# Patient Record
Sex: MALE | Race: WHITE | NOT HISPANIC OR LATINO | Employment: FULL TIME | URBAN - METROPOLITAN AREA
[De-identification: names, ages, dates, MRNs, and addresses within clinical notes are randomized per-mention and may not be internally consistent; named-entity substitution may affect disease eponyms.]

---

## 2017-02-03 ENCOUNTER — OFFICE VISIT (OUTPATIENT)
Dept: FAMILY MEDICINE | Facility: CLINIC | Age: 37
End: 2017-02-03
Payer: MEDICARE

## 2017-02-03 VITALS
BODY MASS INDEX: 22.9 KG/M2 | HEIGHT: 70 IN | RESPIRATION RATE: 18 BRPM | OXYGEN SATURATION: 98 % | TEMPERATURE: 98.4 F | DIASTOLIC BLOOD PRESSURE: 75 MMHG | HEART RATE: 77 BPM | SYSTOLIC BLOOD PRESSURE: 126 MMHG | WEIGHT: 160 LBS

## 2017-02-03 DIAGNOSIS — Z01.818 PREOP GENERAL PHYSICAL EXAM: Primary | ICD-10-CM

## 2017-02-03 DIAGNOSIS — F31.70 BIPOLAR DISORDER IN FULL REMISSION, MOST RECENT EPISODE UNSPECIFIED TYPE (H): ICD-10-CM

## 2017-02-03 DIAGNOSIS — M25.531 RIGHT WRIST PAIN: ICD-10-CM

## 2017-02-03 DIAGNOSIS — Z87.2 HISTORY OF KELOID OF SKIN: ICD-10-CM

## 2017-02-03 PROCEDURE — 99214 OFFICE O/P EST MOD 30 MIN: CPT | Performed by: FAMILY MEDICINE

## 2017-02-03 NOTE — NURSING NOTE
"Chief Complaint   Patient presents with     Pre-Op Exam       Initial /75 mmHg  Pulse 77  Temp(Src) 98.4  F (36.9  C) (Oral)  Resp 18  Ht 5' 10.25\" (1.784 m)  Wt 160 lb (72.576 kg)  BMI 22.80 kg/m2  SpO2 98% Estimated body mass index is 22.8 kg/(m^2) as calculated from the following:    Height as of this encounter: 5' 10.25\" (1.784 m).    Weight as of this encounter: 160 lb (72.576 kg).  Medication Reconciliation: complete     Danielle Jin CMA        "

## 2017-02-03 NOTE — MR AVS SNAPSHOT
After Visit Summary   2/3/2017    Bereket Perry    MRN: [id number]           Patient Information     Date Of Birth          1980        Visit Information        Provider Department      2/3/2017 4:00 PM Clyde Farr MD [facility]        Today's Diagnoses     Preop general physical exam    -  1     Right wrist pain         Bipolar disorder in full remission, most recent episode unspecified type (H)         History of keloid of skin           Care Instructions      Before Your Surgery      Call your surgeon if there is any change in your health. This includes signs of a cold or flu (such as a sore throat, runny nose, cough, rash or fever).    Do not smoke, drink alcohol or take over the counter medicine (unless your surgeon or primary care doctor tells you to) for the 24 hours before and after surgery.    If you take prescribed drugs: Follow your doctor s orders about which medicines to take and which to stop until after surgery.    Eating and drinking prior to surgery: follow the instructions from your surgeon    Take a shower or bath the night before surgery. Use the soap your surgeon gave you to gently clean your skin. If you do not have soap from your surgeon, use your regular soap. Do not shave or scrub the surgery site.  Wear clean pajamas and have clean sheets on your bed.         Follow-ups after your visit        Who to contact     If you have questions or need follow up information about today's clinic visit or your schedule please contact [facility] directly at [phone].  Normal or non-critical lab and imaging results will be communicated to you by MyChart, letter or phone within 4 business days after the clinic has received the results. If you do not hear from us within 7 days, please contact the clinic through MyChart or phone. If you have a critical or abnormal lab result, we will notify you by phone as soon as possible.  Submit  "refill requests through [employer] or call your pharmacy and they will forward the refill request to us. Please allow 3 business days for your refill to be completed.          Additional Information About Your Visit        [email or web address] Information     [employer] gives you secure access to your electronic health record. If you see a primary care provider, you can also send messages to your care team and make appointments. If you have questions, please call your primary care clinic.  If you do not have a primary care provider, please call [phone] and they will assist you.        Care EveryWhere ID     This is your Care EveryWhere ID. This could be used by other organizations to access your [facility] medical records  [id number]        Your Vitals Were     Pulse Temperature Respirations Height BMI (Body Mass Index) Pulse Oximetry    77 98.4  F (36.9  C) (Oral) 18 5' 10.25\" (1.784 m) 22.80 kg/m2 98%       Blood Pressure from Last 3 Encounters:   02/03/17 126/75   12/05/16 122/80   06/22/16 115/67    Weight from Last 3 Encounters:   02/03/17 160 lb (72.576 kg)   12/05/16 158 lb (71.668 kg)   06/22/16 159 lb (72.122 kg)              Today, you had the following     No orders found for display       Primary Care Provider Office Phone # Fax #    Clyde Farr [phone]       [facility] [address]  [facility] [address]        Thank you!     Thank you for choosing [facility]  for your care. Our goal is always to provide you with excellent care. Hearing back from our patients is one way we can continue to improve our services. Please take a few minutes to complete the written survey that you may receive in the mail after your visit with us. Thank you!             Your Updated Medication List - Protect others around you: Learn how to safely use, store and throw away your medicines at www.disposemymeds.org.      Notice  As of 2/3/2017  4:40 PM    You have not been prescribed any " medications.

## 2017-02-03 NOTE — PROGRESS NOTES
[address] [address]  [phone]  Dept: [phone]    PRE-OP EVALUATION:  Today's date: 2017    Bereket Perry (: 1980) presents for pre-operative evaluation assessment as requested by Dr. Jimmy Kaufman.  He requires evaluation and anesthesia risk assessment prior to undergoing surgery/procedure for treatment of right wrist .  Proposed procedure: right wrist    Date of Surgery/ Procedure: 2017  Time of Surgery/ Procedure: 1:00  Hospital/Surgical Facility: [facility]   Fax number for surgical facility: [phone]  Primary Physician: Clyde Farr  Type of Anesthesia Anticipated: to be determined    Patient has a Health Care Directive or Living Will:  NO    1. NO - Do you have a history of heart attack, stroke, stent, bypass or surgery on an artery in the head, neck, heart or legs?  2. NO - Do you ever have any pain or discomfort in your chest?  3. NO - Do you have a history of  Heart Failure?  4. NO - Are you troubled by shortness of breath when: walking on the level, up a slight hill or at night?  5. NO - Do you currently have a cold, bronchitis or other respiratory infection?  6. NO - Do you have a cough, shortness of breath or wheezing?  7. NO - Do you sometimes get pains in the calves of your legs when you walk?  8. NO - Do you or anyone in your family have previous history of blood clots?  9. NO - Do you or does anyone in your family have a serious bleeding problem such as prolonged bleeding following surgeries or cuts?  10. NO - Have you ever had problems with anemia or been told to take iron pills?  11. NO - Have you had any abnormal blood loss such as black, tarry or bloody stools, or abnormal vaginal bleeding?  12. NO - Have you ever had a blood transfusion?  13. NO - Have you or any of your relatives ever had problems with anesthesia?  14. NO - Do you have sleep apnea, excessive snoring or daytime  drowsiness?  15. NO - Do you have any prosthetic heart valves?  16. NO - Do you have prosthetic joints?  17. NO - Is there any chance that you may be pregnant?            HPI:                                                      Brief HPI related to upcoming procedure: wrist fracture years ago with current deformity and pain. To have surgery to correct this.       Bipolar-controlled currently.     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 Provider to review       Bipolar disorder (H) 03/01/2005     Manic-depressive  Problem list name updated by automated process. Provider to review       Tourette's disorder 03/01/2005      Past Medical History   Diagnosis Date     Allergic rhinitis, cause unspecified      Allergic rhinitis     Bipolar disorder, unspecified (H)      Manic-depressive     Tourette's disorder      Esophageal reflux      Gastroesophageal reflux     Hiatal hernia      s/p douglas     Past Surgical History   Procedure Laterality Date     Surgical history of -   1995     LT wrist surgery     Surgical history of -   1999     RT wrist surgery     Esophagoscopy, gastroscopy, duodenoscopy (egd), combined  11/4/2013     Procedure: COMBINED ESOPHAGOSCOPY, GASTROSCOPY, DUODENOSCOPY (EGD), BIOPSY SINGLE OR MULTIPLE;;  Surgeon: Alex Erwin MD;  Location:  GI     Clavicle surgery       Esophageal motility study  12/17/13     Hc esoph/gas reflux test w nasal imped electrode  12/17/2013     Procedure: ESOPHAGEAL IMPEDENCE FUNCTION TEST 1 HOUR OR LESS;  Surgeon: Alex Erwin MD;  Location:  GI     Laparoscopic nissen fundoplication  1/10/2014     Procedure:  "LAPAROSCOPIC NISSEN FUNDOPLICATION;  Laparoscopic Nissen Fundoplication,hiatal hernia repair,and Upper Endoscopy *Latex Allergy* ;  Surgeon: Daniel Gutiérrez MD;  Location: [facility] OR     Esophagoscopy, gastroscopy, duodenoscopy (egd), combined  1/10/2014     Procedure: COMBINED ESOPHAGOSCOPY, GASTROSCOPY, DUODENOSCOPY (EGD);;  Surgeon: Daniel Gutiérrez MD;  Location: [facility] OR     Other surgical history  5/2015     wrist surgery     No current outpatient prescriptions on file.     OTC products: None, except as noted above    Allergies   Allergen Reactions     Aloe      Bee Venom      Latex Itching     Morphine      Synthetic also     Other [Seasonal Allergies]      Fleece       Percocet [Oxycodone-Acetaminophen]      percocet      Risperidone      Trouble swallowing     Sodium Lauryl Sulfate      Wool Fiber      No Clinical Screening - See Comments Rash     Contact dermatitis       Latex Allergy: yes-contact dermatitis. Not severe    Social History   Substance Use Topics     Smoking status: Former Smoker -- 1.00 packs/day for 8 years     Types: Cigarettes     Start date: 07/01/1995     Quit date: 06/25/2003     Smokeless tobacco: Never Used     Alcohol Use: No     History   Drug Use No       REVIEW OF SYSTEMS:                                                    C: NEGATIVE for fever, chills, change in weight  E/M: NEGATIVE for ear, mouth and throat problems  R: NEGATIVE for significant cough or SOB  CV: NEGATIVE for chest pain, palpitations or peripheral edema    EXAM:                                                    /75 mmHg  Pulse 77  Temp(Src) 98.4  F (36.9  C) (Oral)  Resp 18  Ht 5' 10.25\" (1.784 m)  Wt 160 lb (72.576 kg)  BMI 22.80 kg/m2  SpO2 98%  GENERAL APPEARANCE: healthy, alert and no distress  HENT: ear canals and TM's normal and nose and mouth without ulcers or lesions  RESP: lungs clear to auscultation - no rales, rhonchi or wheezes  CV: regular rate and rhythm, normal S1 S2, " no S3 or S4 and no murmur, click or rub   ABDOMEN: soft, nontender, no HSM or masses and bowel sounds normal  NEURO: Normal strength and tone, sensory exam grossly normal, mentation intact and speech normal    DIAGNOSTICS:                                                    No labs or EKG required for low risk surgery (cataract, skin procedure, breast biopsy, etc)    Recent Labs   Lab Test  12/05/16   1411 08/11/15  05/22/14   1208  02/09/14   1454   HGB  14.6   --   13.8  14.7   PLT  209   --   204  219   INR   --    --    --   1.02   NA  142  140  141  142   POTASSIUM  4.2  3.8  3.9  4.0   CR  1.16  0.94  1.04  0.98        IMPRESSION:                                                        ICD-10-CM    1. Preop general physical exam Z01.818    2. Right wrist pain M25.531    3. Bipolar disorder in full remission, most recent episode unspecified type (H) F31.70    4. History of keloid of skin Z87.2         The proposed surgical procedure is considered INTERMEDIATE risk.    REVISED CARDIAC RISK INDEX  The patient has the following serious cardiovascular risks for perioperative complications such as (MI, PE, VFib and 3  AV Block):  No serious cardiac risks  INTERPRETATION: 0 risks: Class I (very low risk - 0.4% complication rate)    The patient has the following additional risks for perioperative complications:  No identified additional risks      RECOMMENDATIONS:                                                      --Patient is to take all scheduled medications on the day of surgery EXCEPT for modifications listed below.    APPROVAL GIVEN to proceed with proposed procedure, without further diagnostic evaluation       Signed Electronically by: Clyde Farr MD    Copy of this evaluation report is provided to requesting physician.    [facility] Preop Guidelines

## 2017-02-06 ENCOUNTER — TRANSFERRED RECORDS (OUTPATIENT)
Dept: HEALTH INFORMATION MANAGEMENT | Facility: CLINIC | Age: 37
End: 2017-02-06

## 2017-02-15 ENCOUNTER — TRANSFERRED RECORDS (OUTPATIENT)
Dept: HEALTH INFORMATION MANAGEMENT | Facility: CLINIC | Age: 37
End: 2017-02-15

## 2017-03-01 ENCOUNTER — TRANSFERRED RECORDS (OUTPATIENT)
Dept: HEALTH INFORMATION MANAGEMENT | Facility: CLINIC | Age: 37
End: 2017-03-01

## 2017-03-22 ENCOUNTER — TRANSFERRED RECORDS (OUTPATIENT)
Dept: HEALTH INFORMATION MANAGEMENT | Facility: CLINIC | Age: 37
End: 2017-03-22

## 2017-04-12 ENCOUNTER — TRANSFERRED RECORDS (OUTPATIENT)
Dept: HEALTH INFORMATION MANAGEMENT | Facility: CLINIC | Age: 37
End: 2017-04-12

## 2017-05-08 ENCOUNTER — DOCUMENTATION ONLY (OUTPATIENT)
Dept: FAMILY MEDICINE | Facility: CLINIC | Age: 37
End: 2017-05-08

## 2017-05-08 NOTE — PROGRESS NOTES
[facility] needs you to review and check off the ability to perform. In form folder. No medication list to print. Katrina Salvador CMA

## 2017-05-08 NOTE — PROGRESS NOTES
Reason for call: Form   Our goal is to have forms completed within 72 hours, however some forms may require a visit or additional information.     Who is the form from? Patient  Where did the form come from? Patient or family brought in     What clinic location was the form placed at?   Where was the form placed? 's Box  What number is listed as a contact on the form? [phone]    Phone call message - patient request for a letter, form or note:     Date needed: as soon as possible  Please fax to [phone]  Has the patient signed a consent form for release of information? NO    Additional comments: Patient would like to start program as soon as possible and needs this form to be filled out to begin.    Type of letter, form or note: medical    Phone Number Pt can be reached at: Home number on file [phone] (home)  Best Time: Any  Can we leave a detailed message on this number? YES

## 2017-05-10 ENCOUNTER — TRANSFERRED RECORDS (OUTPATIENT)
Dept: HEALTH INFORMATION MANAGEMENT | Facility: CLINIC | Age: 37
End: 2017-05-10

## 2017-06-07 ENCOUNTER — TRANSFERRED RECORDS (OUTPATIENT)
Dept: HEALTH INFORMATION MANAGEMENT | Facility: CLINIC | Age: 37
End: 2017-06-07

## 2017-07-15 ENCOUNTER — NURSE TRIAGE (OUTPATIENT)
Dept: NURSING | Facility: CLINIC | Age: 37
End: 2017-07-15

## 2017-07-15 NOTE — TELEPHONE ENCOUNTER
Patient calling requesting his bllod type.  Lab performed on  show patient's blood type is O Positive.  Patient verbalized understanding and is appreciative of information.

## 2017-07-20 ENCOUNTER — OFFICE VISIT (OUTPATIENT)
Dept: FAMILY MEDICINE | Facility: CLINIC | Age: 37
End: 2017-07-20
Payer: MEDICARE

## 2017-07-20 VITALS
BODY MASS INDEX: 21.81 KG/M2 | HEART RATE: 75 BPM | DIASTOLIC BLOOD PRESSURE: 66 MMHG | TEMPERATURE: 98 F | SYSTOLIC BLOOD PRESSURE: 111 MMHG | OXYGEN SATURATION: 96 % | WEIGHT: 155.8 LBS | HEIGHT: 71 IN

## 2017-07-20 DIAGNOSIS — R51.9 NONINTRACTABLE EPISODIC HEADACHE, UNSPECIFIED HEADACHE TYPE: Primary | ICD-10-CM

## 2017-07-20 DIAGNOSIS — E55.9 VITAMIN D DEFICIENCY: ICD-10-CM

## 2017-07-20 DIAGNOSIS — F31.70 BIPOLAR DISORDER IN PARTIAL REMISSION, MOST RECENT EPISODE UNSPECIFIED TYPE (H): ICD-10-CM

## 2017-07-20 DIAGNOSIS — G47.00 INSOMNIA, UNSPECIFIED TYPE: ICD-10-CM

## 2017-07-20 LAB
BASOPHILS # BLD AUTO: 0 10E9/L (ref 0–0.2)
BASOPHILS NFR BLD AUTO: 0.3 %
DIFFERENTIAL METHOD BLD: NORMAL
EOSINOPHIL # BLD AUTO: 0.1 10E9/L (ref 0–0.7)
EOSINOPHIL NFR BLD AUTO: 1 %
ERYTHROCYTE [DISTWIDTH] IN BLOOD BY AUTOMATED COUNT: 12.8 % (ref 10–15)
HCT VFR BLD AUTO: 44.4 % (ref 40–53)
HGB BLD-MCNC: 15 G/DL (ref 13.3–17.7)
LYMPHOCYTES # BLD AUTO: 1.7 10E9/L (ref 0.8–5.3)
LYMPHOCYTES NFR BLD AUTO: 26.9 %
MCH RBC QN AUTO: 30.8 PG (ref 26.5–33)
MCHC RBC AUTO-ENTMCNC: 33.8 G/DL (ref 31.5–36.5)
MCV RBC AUTO: 91 FL (ref 78–100)
MONOCYTES # BLD AUTO: 0.6 10E9/L (ref 0–1.3)
MONOCYTES NFR BLD AUTO: 9.4 %
NEUTROPHILS # BLD AUTO: 3.9 10E9/L (ref 1.6–8.3)
NEUTROPHILS NFR BLD AUTO: 62.4 %
PLATELET # BLD AUTO: 249 10E9/L (ref 150–450)
RBC # BLD AUTO: 4.87 10E12/L (ref 4.4–5.9)
WBC # BLD AUTO: 6.3 10E9/L (ref 4–11)

## 2017-07-20 PROCEDURE — 82306 VITAMIN D 25 HYDROXY: CPT | Performed by: NURSE PRACTITIONER

## 2017-07-20 PROCEDURE — 83735 ASSAY OF MAGNESIUM: CPT | Performed by: NURSE PRACTITIONER

## 2017-07-20 PROCEDURE — 85025 COMPLETE CBC W/AUTO DIFF WBC: CPT | Performed by: NURSE PRACTITIONER

## 2017-07-20 PROCEDURE — 80053 COMPREHEN METABOLIC PANEL: CPT | Performed by: NURSE PRACTITIONER

## 2017-07-20 PROCEDURE — 36415 COLL VENOUS BLD VENIPUNCTURE: CPT | Performed by: NURSE PRACTITIONER

## 2017-07-20 PROCEDURE — 99214 OFFICE O/P EST MOD 30 MIN: CPT | Performed by: NURSE PRACTITIONER

## 2017-07-20 NOTE — MR AVS SNAPSHOT
After Visit Summary   7/20/2017    Bereket Perry    MRN: [id number]           Patient Information     Date Of Birth          1980        Visit Information        Provider Department      7/20/2017 1:00 PM Alexandra Ryan APRN CNP [facility]        Today's Diagnoses     Nonintractable episodic headache, unspecified headache type    -  1    Bipolar disorder in partial remission, most recent episode unspecified type (H)        Insomnia, unspecified type        Vitamin D deficiency           Follow-ups after your visit        Who to contact     If you have questions or need follow up information about today's clinic visit or your schedule please contact [facility] directly at [phone].  Normal or non-critical lab and imaging results will be communicated to you by MyChart, letter or phone within 4 business days after the clinic has received the results. If you do not hear from us within 7 days, please contact the clinic through [employer] or phone. If you have a critical or abnormal lab result, we will notify you by phone as soon as possible.  Submit refill requests through [employer] or call your pharmacy and they will forward the refill request to us. Please allow 3 business days for your refill to be completed.          Additional Information About Your Visit        MyChart Information     [employer] gives you secure access to your electronic health record. If you see a primary care provider, you can also send messages to your care team and make appointments. If you have questions, please call your primary care clinic.  If you do not have a primary care provider, please call [phone] and they will assist you.        Care EveryWhere ID     This is your Care EveryWhere ID. This could be used by other organizations to access your [facility] medical records  [id number]        Your Vitals Were     Pulse Temperature Height Pulse Oximetry BMI (Body Mass  "Index)       75 98  F (36.7  C) (Oral) 5' 11.4\" (1.814 m) 96% 21.49 kg/m2        Blood Pressure from Last 3 Encounters:   07/20/17 111/66   02/03/17 126/75   12/05/16 122/80    Weight from Last 3 Encounters:   07/20/17 155 lb 12.8 oz (70.7 kg)   02/03/17 160 lb (72.6 kg)   12/05/16 158 lb (71.7 kg)              We Performed the Following     CBC with platelets differential     Comprehensive metabolic panel     Magnesium     Vitamin D Deficiency          Today's Medication Changes          These changes are accurate as of: 7/20/17 11:59 PM.  If you have any questions, ask your nurse or doctor.               Start taking these medicines.        Dose/Directions    melatonin 3 MG tablet   Used for:  Insomnia, unspecified type   Started by:  Alexandra Ryan APRN CNP        Dose:  3 mg   Take 1 tablet (3 mg) by mouth nightly as needed for sleep   Quantity:  60 tablet   Refills:  0            Where to get your medicines      Some of these will need a paper prescription and others can be bought over the counter.  Ask your nurse if you have questions.     You don't need a prescription for these medications     melatonin 3 MG tablet                Primary Care Provider Office Phone # Fax #    Clyde Farr [phone]       [place] [address]        Equal Access to Services     GÉNESIS ESCALONA AH: Hadii yolande diaz hadasho Soomaali, waaxda luqadaha, qaybta kaalmada justyna, skyler vilchis. So [facility] [phone].    ATENCIÓN: Si habla español, tiene a douglass disposición servicios gratuitos de asistencia lingüística. Sabrina al [phone].    We comply with applicable federal civil rights laws and [place] laws. We do not discriminate on the basis of race, color, national origin, age, disability sex, sexual orientation or gender identity.            Thank you!     Thank you for choosing [facility]  for your care. Our goal is always " to provide you with excellent care. Hearing back from our patients is one way we can continue to improve our services. Please take a few minutes to complete the written survey that you may receive in the mail after your visit with us. Thank you!             Your Updated Medication List - Protect others around you: Learn how to safely use, store and throw away your medicines at www.disposemymeds.org.          This list is accurate as of: 7/20/17 11:59 PM.  Always use your most recent med list.                   Brand Name Dispense Instructions for use Diagnosis    melatonin 3 MG tablet     60 tablet    Take 1 tablet (3 mg) by mouth nightly as needed for sleep    Insomnia, unspecified type

## 2017-07-20 NOTE — PROGRESS NOTES
"  SUBJECTIVE:                                                    Bereket Perry is a 37 year old male who presents to clinic today for the following health issues:    Headaches      Duration: on going    Description  Location: bilateral in the frontal area   Character: squeezing pain  Frequency:  Comes and goes  Duration:  On going    Intensity:  severe    Accompanying signs and symptoms:    History  Head trauma: YES  Previous tests for headaches:   Able to do daily activities when headache present: YES  Wake with headaches: YES  Daily pain medication use: no   Any changes in: STRESs yes    Tylenol or advil helping.  Just wants to make sure not being caused by vitamin imbalance    Insomnia      Duration: on going    Description  Frequency of insomnia:  several times a week  Time to fall asleep: 2 hours  Middle of night awakenin-2 times a week  Early morning awakening:  occasionally    Accompanying signs and symptoms:  none    History    Problem list and histories reviewed & adjusted, as indicated.  Additional history: as documented      Reviewed and updated as needed this visit by clinical staff  Tobacco  Allergies  Meds  Problems  Med Hx  Surg Hx  Fam Hx  Soc Hx        Reviewed and updated as needed this visit by Provider  Tobacco  Allergies  Meds  Problems  Med Hx  Surg Hx  Fam Hx  Soc Hx          ROS:  Constitutional, HEENT, cardiovascular, pulmonary, gi and gu systems are negative, except as otherwise noted.      OBJECTIVE:   /66  Pulse 75  Temp 98  F (36.7  C) (Oral)  Ht 5' 11.4\" (1.814 m)  Wt 155 lb 12.8 oz (70.7 kg)  SpO2 96%  BMI 21.49 kg/m2  Body mass index is 21.49 kg/(m^2).  GENERAL: healthy, alert and no distress  NECK: no adenopathy, no asymmetry, masses, or scars and thyroid normal to palpation  RESP: lungs clear to auscultation - no rales, rhonchi or wheezes  CV: regular rate and rhythm, normal S1 S2, no S3 or S4, no murmur, click or rub, no peripheral edema and " peripheral pulses strong  ABDOMEN: soft, nontender, no hepatosplenomegaly, no masses and bowel sounds normal  MS: no gross musculoskeletal defects noted, no edema  SKIN: no suspicious lesions or rashes    Diagnostic Test Results:  Results for orders placed or performed in visit on 07/20/17   Magnesium   Result Value Ref Range    Magnesium 2.4 (H) 1.6 - 2.3 mg/dL   Vitamin D Deficiency   Result Value Ref Range    Vitamin D Deficiency screening 34 20 - 75 ug/L   Comprehensive metabolic panel   Result Value Ref Range    Sodium 140 133 - 144 mmol/L    Potassium 3.9 3.4 - 5.3 mmol/L    Chloride 106 94 - 109 mmol/L    Carbon Dioxide 28 20 - 32 mmol/L    Anion Gap 6 3 - 14 mmol/L    Glucose 103 (H) 70 - 99 mg/dL    Urea Nitrogen 20 7 - 30 mg/dL    Creatinine 0.96 0.66 - 1.25 mg/dL    GFR Estimate 88 >60 mL/min/1.7m2    GFR Estimate If Black >90   GFR Calc   >60 mL/min/1.7m2    Calcium 8.6 8.5 - 10.1 mg/dL    Bilirubin Total 1.1 0.2 - 1.3 mg/dL    Albumin 4.1 3.4 - 5.0 g/dL    Protein Total 7.4 6.8 - 8.8 g/dL    Alkaline Phosphatase 95 40 - 150 U/L    ALT 34 0 - 70 U/L    AST 23 0 - 45 U/L   CBC with platelets differential   Result Value Ref Range    WBC 6.3 4.0 - 11.0 10e9/L    RBC Count 4.87 4.4 - 5.9 10e12/L    Hemoglobin 15.0 13.3 - 17.7 g/dL    Hematocrit 44.4 40.0 - 53.0 %    MCV 91 78 - 100 fl    MCH 30.8 26.5 - 33.0 pg    MCHC 33.8 31.5 - 36.5 g/dL    RDW 12.8 10.0 - 15.0 %    Platelet Count 249 150 - 450 10e9/L    Diff Method Automated Method     % Neutrophils 62.4 %    % Lymphocytes 26.9 %    % Monocytes 9.4 %    % Eosinophils 1.0 %    % Basophils 0.3 %    Absolute Neutrophil 3.9 1.6 - 8.3 10e9/L    Absolute Lymphocytes 1.7 0.8 - 5.3 10e9/L    Absolute Monocytes 0.6 0.0 - 1.3 10e9/L    Absolute Eosinophils 0.1 0.0 - 0.7 10e9/L    Absolute Basophils 0.0 0.0 - 0.2 10e9/L       ASSESSMENT/PLAN:       ICD-10-CM    1. Nonintractable episodic headache, unspecified headache type R51 Magnesium      Comprehensive metabolic panel     CBC with platelets differential     CANCELED: Calcium   2. Bipolar disorder in partial remission, most recent episode unspecified type (H) F31.70    3. Insomnia, unspecified type G47.00 melatonin 3 MG tablet   4. Vitamin D deficiency E55.9 Vitamin D Deficiency     Screen labs for any lab imbalance that may contribute to headaches or insomnia.    Bipolar disorder stable.    Start melatonin 3 mg PO QHS.    Vitamin D therapeutic.      NETTE Welch CNP  [facility]

## 2017-07-21 LAB
ALBUMIN SERPL-MCNC: 4.1 G/DL (ref 3.4–5)
ALP SERPL-CCNC: 95 U/L (ref 40–150)
ALT SERPL W P-5'-P-CCNC: 34 U/L (ref 0–70)
ANION GAP SERPL CALCULATED.3IONS-SCNC: 6 MMOL/L (ref 3–14)
AST SERPL W P-5'-P-CCNC: 23 U/L (ref 0–45)
BILIRUB SERPL-MCNC: 1.1 MG/DL (ref 0.2–1.3)
BUN SERPL-MCNC: 20 MG/DL (ref 7–30)
CALCIUM SERPL-MCNC: 8.6 MG/DL (ref 8.5–10.1)
CHLORIDE SERPL-SCNC: 106 MMOL/L (ref 94–109)
CO2 SERPL-SCNC: 28 MMOL/L (ref 20–32)
CREAT SERPL-MCNC: 0.96 MG/DL (ref 0.66–1.25)
DEPRECATED CALCIDIOL+CALCIFEROL SERPL-MC: 34 UG/L (ref 20–75)
GFR SERPL CREATININE-BSD FRML MDRD: 88 ML/MIN/1.7M2
GLUCOSE SERPL-MCNC: 103 MG/DL (ref 70–99)
MAGNESIUM SERPL-MCNC: 2.4 MG/DL (ref 1.6–2.3)
POTASSIUM SERPL-SCNC: 3.9 MMOL/L (ref 3.4–5.3)
PROT SERPL-MCNC: 7.4 G/DL (ref 6.8–8.8)
SODIUM SERPL-SCNC: 140 MMOL/L (ref 133–144)

## 2017-07-25 PROBLEM — E55.9 VITAMIN D DEFICIENCY: Status: ACTIVE | Noted: 2017-07-25

## 2017-07-25 RX ORDER — LANOLIN ALCOHOL/MO/W.PET/CERES
3 CREAM (GRAM) TOPICAL
Qty: 60 TABLET | Refills: 0 | COMMUNITY
Start: 2017-07-25 | End: 2018-07-31

## 2017-08-02 ENCOUNTER — TRANSFERRED RECORDS (OUTPATIENT)
Dept: HEALTH INFORMATION MANAGEMENT | Facility: CLINIC | Age: 37
End: 2017-08-02

## 2017-10-09 ENCOUNTER — TRANSFERRED RECORDS (OUTPATIENT)
Dept: HEALTH INFORMATION MANAGEMENT | Facility: CLINIC | Age: 37
End: 2017-10-09

## 2018-04-25 ENCOUNTER — TRANSFERRED RECORDS (OUTPATIENT)
Dept: HEALTH INFORMATION MANAGEMENT | Facility: CLINIC | Age: 38
End: 2018-04-25

## 2018-05-03 ENCOUNTER — TRANSFERRED RECORDS (OUTPATIENT)
Dept: HEALTH INFORMATION MANAGEMENT | Facility: CLINIC | Age: 38
End: 2018-05-03

## 2018-05-31 ENCOUNTER — TRANSFERRED RECORDS (OUTPATIENT)
Dept: HEALTH INFORMATION MANAGEMENT | Facility: CLINIC | Age: 38
End: 2018-05-31

## 2018-06-22 ENCOUNTER — TELEPHONE (OUTPATIENT)
Dept: FAMILY MEDICINE | Facility: CLINIC | Age: 38
End: 2018-06-22

## 2018-06-22 DIAGNOSIS — R68.89 LIGHT SENSITIVITY: Primary | ICD-10-CM

## 2018-06-22 DIAGNOSIS — R41.840 DIFFICULTY CONCENTRATING: ICD-10-CM

## 2018-06-22 NOTE — TELEPHONE ENCOUNTER
Reason for Call:  Other referral    Detailed comments: Patient states about 5/6 years ago he suffered a concussion and brain injury and now has long-term symptoms. States it has gotten better slowly but he will soon be going back to school and is working full time. States he suffers when there is too much stimulation and noise. He has trouble concentrating / thinking, has light sensitivity, and feel vibrations. He is hoping to be referred directly to a specialist who can help with this. Please assist. Thanks!    Phone Number Patient can be reached at: Home number on file [phone] (home)    Best Time: Any    Can we leave a detailed message on this number? YES    Call taken on 6/22/2018 at 4:42 PM by Cherise Spears

## 2018-06-25 NOTE — TELEPHONE ENCOUNTER
LOV: 7/20/2017    Referral for Neurology placed. LV for patient. Patient to be given phone numbers below.     Your provider has referred you for the following:   Consult at [facility]: [facility] [phone]   [email or web address]  [facility]: [facility] [phone]   [email or web address]  [place]: [facility] [phone]   [email or web address]  Light sensitivity and trouble concentrating     Please be aware that coverage of these services is subject to the terms and limitations of your health insurance plan.  Call member services at your health plan with any benefit or coverage questions.       Please bring the following with you to your appointment:     (1) Any X-Rays, CTs or MRIs which have been performed.  Contact the facility where they were done to arrange for  prior to your scheduled appointment.    (2) List of current medications  (3) This referral request   (4) Any documents/labs given to you for this referral      Thanks! Tasneem Rincon RN

## 2018-07-13 ENCOUNTER — OFFICE VISIT (OUTPATIENT)
Dept: FAMILY MEDICINE | Facility: CLINIC | Age: 38
End: 2018-07-13
Payer: MEDICARE

## 2018-07-13 VITALS
SYSTOLIC BLOOD PRESSURE: 96 MMHG | WEIGHT: 153.4 LBS | HEIGHT: 71 IN | TEMPERATURE: 97.3 F | DIASTOLIC BLOOD PRESSURE: 64 MMHG | OXYGEN SATURATION: 100 % | RESPIRATION RATE: 16 BRPM | HEART RATE: 55 BPM | BODY MASS INDEX: 21.48 KG/M2

## 2018-07-13 DIAGNOSIS — Z13.1 SCREENING FOR DIABETES MELLITUS: Primary | ICD-10-CM

## 2018-07-13 LAB — GLUCOSE SERPL-MCNC: 94 MG/DL (ref 70–99)

## 2018-07-13 PROCEDURE — 36415 COLL VENOUS BLD VENIPUNCTURE: CPT | Performed by: NURSE PRACTITIONER

## 2018-07-13 PROCEDURE — 99213 OFFICE O/P EST LOW 20 MIN: CPT | Performed by: NURSE PRACTITIONER

## 2018-07-13 PROCEDURE — 82947 ASSAY GLUCOSE BLOOD QUANT: CPT | Performed by: NURSE PRACTITIONER

## 2018-07-13 ASSESSMENT — PATIENT HEALTH QUESTIONNAIRE - PHQ9: 5. POOR APPETITE OR OVEREATING: SEVERAL DAYS

## 2018-07-13 ASSESSMENT — ANXIETY QUESTIONNAIRES
GAD7 TOTAL SCORE: 2
7. FEELING AFRAID AS IF SOMETHING AWFUL MIGHT HAPPEN: NOT AT ALL
6. BECOMING EASILY ANNOYED OR IRRITABLE: NOT AT ALL
1. FEELING NERVOUS, ANXIOUS, OR ON EDGE: SEVERAL DAYS
3. WORRYING TOO MUCH ABOUT DIFFERENT THINGS: NOT AT ALL
2. NOT BEING ABLE TO STOP OR CONTROL WORRYING: NOT AT ALL
5. BEING SO RESTLESS THAT IT IS HARD TO SIT STILL: NOT AT ALL

## 2018-07-13 NOTE — MR AVS SNAPSHOT
After Visit Summary   7/13/2018    Bereket Perry    MRN: [id number]           Patient Information     Date Of Birth          1980        Visit Information        Provider Department      7/13/2018 11:20 AM Alexandra Ryan APRN CNP [facility]        Today's Diagnoses     Screening for diabetes mellitus    -  1       Follow-ups after your visit        Your next 10 appointments already scheduled     Jul 31, 2018  2:00 PM CDT   New Visit with Santosh Issa MD   [facility]    [address] [place] [id number] [phone]              Who to contact     If you have questions or need follow up information about today's clinic visit or your schedule please contact [facility] directly at [phone].  Normal or non-critical lab and imaging results will be communicated to you by MyChart, letter or phone within 4 business days after the clinic has received the results. If you do not hear from us within 7 days, please contact the clinic through MyChart or phone. If you have a critical or abnormal lab result, we will notify you by phone as soon as possible.  Submit refill requests through wat[employer]ame or call your pharmacy and they will forward the refill request to us. Please allow 3 business days for your refill to be completed.          Additional Information About Your Visit        MyChart Information     [employer]ame gives you secure access to your electronic health record. If you see a primary care provider, you can also send messages to your care team and make appointments. If you have questions, please call your primary care clinic.  If you do not have a primary care provider, please call [phone] and they will assist you.        Care EveryWhere ID     This is your Care EveryWhere ID. This could be used by other organizations to access your [facility]  "records  [id number]        Your Vitals Were     Pulse Temperature Respirations Height Pulse Oximetry BMI (Body Mass Index)    55 97.3  F (36.3  C) (Oral) 16 5' 11\" (1.803 m) 100% 21.39 kg/m2       Blood Pressure from Last 3 Encounters:   07/13/18 96/64   07/20/17 111/66   02/03/17 126/75    Weight from Last 3 Encounters:   07/13/18 153 lb 6.4 oz (69.6 kg)   07/20/17 155 lb 12.8 oz (70.7 kg)   02/03/17 160 lb (72.6 kg)              We Performed the Following     Glucose        Primary Care Provider Office Phone # Fax #    Clyde Farr [phone] [address]  [facility] [address]        Equal Access to Services     MAYRA ESCALONA : Hadii aad emily hadasho Soomaali, waaxda luqadaha, qaybta kaalmada adeegyada, skyler elias haykael enriquez . So [facility] [phone].    ATENCIÓN: Si habla español, tiene a douglass disposición servicios gratuitos de asistencia lingüística. Sabrina al [phone].    We comply with applicable federal civil rights laws and [place] laws. We do not discriminate on the basis of race, color, national origin, age, disability, sex, sexual orientation, or gender identity.            Thank you!     Thank you for choosing [facility]  for your care. Our goal is always to provide you with excellent care. Hearing back from our patients is one way we can continue to improve our services. Please take a few minutes to complete the written survey that you may receive in the mail after your visit with us. Thank you!             Your Updated Medication List - Protect others around you: Learn how to safely use, store and throw away your medicines at www.disposemymeds.org.          This list is accurate as of 7/13/18 11:59 PM.  Always use your most recent med list.                   Brand Name Dispense Instructions for use Diagnosis    melatonin 3 MG tablet     60 tablet    Take 1 tablet (3 mg) by mouth nightly as needed for sleep    Insomnia, unspecified type    "

## 2018-07-13 NOTE — PROGRESS NOTES
"  SUBJECTIVE:   Bereket Perry is a 38 year old male who presents to clinic today for the following health issues:        Musculoskeletal problem/pain      Duration: 3 years     Description  Location: foot and hands     Intensity:  severe    Accompanying signs and symptoms: numbness, tingling, swelling and redness    History  Previous similar problem: YES  Previous evaluation:  none    Precipitating or alleviating factors:  Trauma or overuse: YES- pt had hand surgery 3 months   Aggravating factors include: sitting, standing and walking    Therapies tried and outcome: nothing    Does not think he is more thirsty or voiding more than normal.    He is worried he has diabetes, would like to be screened for this.      Problem list and histories reviewed & adjusted, as indicated.  Additional history: as documented    Reviewed and updated as needed this visit by clinical staff  Tobacco  Allergies  Meds  Problems  Med Hx  Surg Hx  Fam Hx  Soc Hx        Reviewed and updated as needed this visit by Provider  Tobacco  Allergies  Meds  Problems  Med Hx  Surg Hx  Fam Hx  Soc Hx          ROS:  Constitutional, HEENT, cardiovascular, pulmonary, gi and gu systems are negative, except as otherwise noted.    OBJECTIVE:     BP 96/64  Pulse 55  Temp 97.3  F (36.3  C) (Oral)  Resp 16  Ht 5' 11\" (1.803 m)  Wt 153 lb 6.4 oz (69.6 kg)  SpO2 100%  BMI 21.39 kg/m2  Body mass index is 21.39 kg/(m^2).  GENERAL: healthy, alert and no distress  NECK: no adenopathy, no asymmetry, masses, or scars and thyroid normal to palpation  RESP: lungs clear to auscultation - no rales, rhonchi or wheezes  CV: regular rate and rhythm, normal S1 S2, no S3 or S4, no murmur, click or rub, no peripheral edema and peripheral pulses strong  ABDOMEN: soft, nontender, no hepatosplenomegaly, no masses and bowel sounds normal  MS: no gross musculoskeletal defects noted, no edema  SKIN: no suspicious lesions or rashes  NEURO: Normal strength and " tone, mentation intact and speech normal    Diagnostic Test Results:  Results for orders placed or performed in visit on 07/13/18   Glucose   Result Value Ref Range    Glucose 94 70 - 99 mg/dL       ASSESSMENT/PLAN:       ICD-10-CM    1. Screening for diabetes mellitus Z13.1 Glucose   normal fasting glucose.    Normal physical exam.  Sensation intact.    F/u if persists or worsens.  May consider ref to neurology.        NETTE Welch CNP  [facility]

## 2018-07-14 ASSESSMENT — PATIENT HEALTH QUESTIONNAIRE - PHQ9: SUM OF ALL RESPONSES TO PHQ QUESTIONS 1-9: 3

## 2018-07-14 ASSESSMENT — ANXIETY QUESTIONNAIRES: GAD7 TOTAL SCORE: 2

## 2018-07-31 ENCOUNTER — OFFICE VISIT (OUTPATIENT)
Dept: NEUROLOGY | Facility: CLINIC | Age: 38
End: 2018-07-31
Payer: MEDICARE

## 2018-07-31 VITALS
OXYGEN SATURATION: 98 % | TEMPERATURE: 98.9 F | BODY MASS INDEX: 26.36 KG/M2 | WEIGHT: 189 LBS | HEART RATE: 67 BPM | SYSTOLIC BLOOD PRESSURE: 102 MMHG | DIASTOLIC BLOOD PRESSURE: 68 MMHG | RESPIRATION RATE: 16 BRPM

## 2018-07-31 DIAGNOSIS — G44.329 CHRONIC POST-TRAUMATIC HEADACHE, NOT INTRACTABLE: Primary | ICD-10-CM

## 2018-07-31 PROCEDURE — 99205 OFFICE O/P NEW HI 60 MIN: CPT | Performed by: PSYCHIATRY & NEUROLOGY

## 2018-07-31 NOTE — MR AVS SNAPSHOT
After Visit Summary   7/31/2018    Bereket Perry    MRN: [id number]           Patient Information     Date Of Birth          1980        Visit Information        Provider Department      7/31/2018 2:00 PM Santosh Issa MD [facility]        Today's Diagnoses     Chronic post-traumatic headache, not intractable    -  1      Care Instructions    AFTER VISIT SUMMARY (AVS):    At today's visit we discussed various diagnostic possibilities for your symptoms that are likely related to posttraumatic tension type headaches, available treatment options, and the reasons for possible future work-up (brain MRI)    At the present time, you did not want to start any headache preventive medications.  Please return if you decide to try something in the future or your headache worsens.    Reviewed non-pharmacological headache prevention measures include proper sleep hygiene, regular meals, adequate hydration, regular aerobic exercise, and stress reduction techniques.    Additional recommendations after the work-up.    Next follow-up appointment is on as needed basis.    Please do not hesitate to call me with any questions or concerns.    Thanks.           Follow-ups after your visit        Follow-up notes from your care team     Return if symptoms worsen or fail to improve.      Who to contact     If you have questions or need follow up information about today's clinic visit or your schedule please contact [facility] directly at [phone].  Normal or non-critical lab and imaging results will be communicated to you by MyChart, letter or phone within 4 business days after the clinic has received the results. If you do not hear from us within 7 days, please contact the clinic through MyChart or phone. If you have a critical or abnormal lab result, we will notify you by phone as soon as possible.  Submit refill requests through [employer] or call your pharmacy and  they will forward the refill request to us. Please allow 3 business days for your refill to be completed.          Additional Information About Your Visit        Super[employer] Information     [employer] gives you secure access to your electronic health record. If you see a primary care provider, you can also send messages to your care team and make appointments. If you have questions, please call your primary care clinic.  If you do not have a primary care provider, please call [phone] and they will assist you.        Care EveryWhere ID     This is your Care EveryWhere ID. This could be used by other organizations to access your [facility] medical records  [id number]        Your Vitals Were     Pulse Temperature Respirations Pulse Oximetry BMI (Body Mass Index)       67 98.9  F (37.2  C) (Oral) 16 98% 26.36 kg/m2        Blood Pressure from Last 3 Encounters:   07/31/18 102/68   07/13/18 96/64   07/20/17 111/66    Weight from Last 3 Encounters:   07/31/18 85.7 kg (189 lb)   07/13/18 69.6 kg (153 lb 6.4 oz)   07/20/17 70.7 kg (155 lb 12.8 oz)              Today, you had the following     No orders found for display       Primary Care Provider Office Phone # Fax #    Clyde Farr [phone] [address]  [facility] [address]        Equal Access to Services     GÉNESIS ESCALONA : Hadii aad ku hadasho Soomaali, waaxda luqadaha, qaybta kaalmada adejersonyada, skyler vilchis. So [facility] [phone].    ATENCIÓN: Si habla español, tiene a douglass disposición servicios gratuitos de asistencia lingüística. Lllilibeth al [phone].    We comply with applicable federal civil rights laws and [place] laws. We do not discriminate on the basis of race, color, national origin, age, disability, sex, sexual orientation, or gender identity.            Thank you!     Thank you for choosing [facility]  for your care. Our goal is always to provide you with excellent care. Hearing back from  our patients is one way we can continue to improve our services. Please take a few minutes to complete the written survey that you may receive in the mail after your visit with us. Thank you!             Your Updated Medication List - Protect others around you: Learn how to safely use, store and throw away your medicines at www.disposemymeds.org.      Notice  As of 7/31/2018  2:44 PM    You have not been prescribed any medications.

## 2018-07-31 NOTE — LETTER
7/31/2018         RE: Bereket Perry  [address] [place] [address]        Dear Colleague,    Thank you for referring your patient, Bereket Perry, to the [facility]. Please see a copy of my visit note below.    INITIAL NEUROLOGY CONSULTATION    DATE OF VISIT: 7/31/2018  CLINIC LOCATION: [facility]  MRN: [id number]  PATIENT NAME: Bereket Perry  YOB: 1980    PRIMARY CARE PROVIDER: Clyde Farr MD.    REASON FOR VISIT:   Chief Complaint   Patient presents with     Consult     headaches -post concussion 6 years ago     HISTORY OF PRESENT ILLNESS:                                                    Mr. Bereket Perry is 38 year old right handed male patient with past medical history of bipolar disorder, Tourette's syndrome, RLS, and hiatal hernia, who was seen in consultation today requested by Clyde Farr MD for posttraumatic headaches.    Per patient's report, he was in his usual state of health until September 2013, when after the head injury/possible concussion, he developed intermittent headache that assumed the chronic character.  He denies any significant recent changes or worsening of his headache characteristics.  In fact, he feels that it actually improved over the last 2 years.    Currently, almost constant pressure-like 1-10/10 headache affects 80% of his head, mainly bitemporal and bifrontal areas sparing the base of the head.  It fluctuates throughout the day.  It feels like swelling at the back of the head with occasional throbbing.  The headache is worse with sleep deprivation, loud environments, bright lights, computer screens, and overstimulation.  The headache is better with rest.  Associated symptoms include increased sensitivity to light and mild nausea without emesis, difficulty to concentrate, and fatigue.  No memory concerns.  The patient denies any associated focal neurological symptoms and intolerance of  physical activity.  He does not need to be in dark quiet room when headache is severe.  He does not take any analgesics, and also did not try any headache preventive therapy in the past.    The patient reports that his sleep is poor.  He eats regular meals.  His water intake consists of approximately 40 ounces of water.  No caffeine.  He describes his stress level as severe.    According to previous neurology notes and Care Everywhere, the patient was previously seen by Dr. Eddy at the [facility] for Tourette's syndrome and left eye pain.  He had EMG for bilateral hand numbness and tingling in 2014, which was normal.  He also was [facility] by [facility]. Cleaning [facility] with concerns of possible demyelinating disease and RLS.  The neurological exam at that time was normal.  Brain MRI was discussed, but deferred by patient.    No recent labs, except blood glucose which was normal in July 2018.    The patient denies a history of recent head injury. Prior neurological history: negative for stroke, brain neoplasms, seizure disorders, multiple sclerosis, meningitis, encephalitis, and major head injuries.  The patient reports the history of one seizure 6 weeks after birth without recurrence.  He also thinks that he possibly had more concussions without long-term sequelae because he played contact sports in school.    Neurologic Review of Systems - no amaurosis, diplopia, abnormal speech, unilateral numbness or weakness. He endorses multiple chronic complaints on 14-point comprehensive review of systems of the Patient Health History Form, that were reviewed and will be scanned into his electronic medical record at the end of this encounter. His primary care and other medical providers are aware and addressing all of them.  PAST MEDICAL/SURGICAL HISTORY:                                                    I personally reviewed patient's past medical and surgical history with the patient at  today's visit.  Patient Active Problem List   Diagnosis     Allergic rhinitis     Bipolar disorder (H)     Tourette's disorder     History of keloid of skin     Heel pain     Muscle tension dysphonia     Vitamin D deficiency     Past Medical History:   Diagnosis Date     Allergic rhinitis, cause unspecified     Allergic rhinitis     Bipolar disorder, unspecified     Manic-depressive     Esophageal reflux     Gastroesophageal reflux     Hiatal hernia     s/p douglas     Tourette's disorder      Past Surgical History:   Procedure Laterality Date     CLAVICLE SURGERY       ESOPHAGEAL MOTILITY STUDY  12/17/13     ESOPHAGOSCOPY, GASTROSCOPY, DUODENOSCOPY (EGD), COMBINED  11/4/2013    Procedure: COMBINED ESOPHAGOSCOPY, GASTROSCOPY, DUODENOSCOPY (EGD), BIOPSY SINGLE OR MULTIPLE;;  Surgeon: Alex Erwin MD;  Location:  GI     ESOPHAGOSCOPY, GASTROSCOPY, DUODENOSCOPY (EGD), COMBINED  1/10/2014    Procedure: COMBINED ESOPHAGOSCOPY, GASTROSCOPY, DUODENOSCOPY (EGD);;  Surgeon: Daniel Gutiérrez MD;  Location: [facility]      ESOPH/GAS REFLUX TEST W NASAL IMPED ELECTRODE  12/17/2013    Procedure: ESOPHAGEAL IMPEDENCE FUNCTION TEST 1 HOUR OR LESS;  Surgeon: Alex Erwin MD;  Location:  GI     LAPAROSCOPIC NISSEN FUNDOPLICATION  1/10/2014    Procedure: LAPAROSCOPIC NISSEN FUNDOPLICATION;  Laparoscopic Nissen Fundoplication,hiatal hernia repair,and Upper Endoscopy *Latex Allergy* ;  Surgeon: Daniel Gutiérrez MD;  Location: [facility]     OTHER SURGICAL HISTORY  5/2015    wrist surgery     SURGICAL HISTORY OF -   1995    LT wrist surgery     SURGICAL HISTORY OF -   1999    RT wrist surgery     MEDICATIONS:                                                    I personally reviewed patient's medications and allergies with the patient at today's visit.  The patient does not have any prescribed medications.  ALLERGIES:                                                      Allergies   Allergen Reactions      Aloe      Bee Venom      Latex Itching     Morphine      Synthetic also     Other [Seasonal Allergies]      Fleece       Percocet [Oxycodone-Acetaminophen]      percocet      Risperidone      Trouble swallowing     Sodium Lauryl Sulfate      Wool Fiber      No Clinical Screening - See Comments Rash     Contact dermatitis      FAMILY/SOCIAL HISTORY:                                                    Family and social history was reviewed with the patient at today's visit.  No family history of neurological disorders, except stroke.  Single, lives alone.  Former smoker.  Quit in 2003.  Denies use of alcohol and recreational drugs.  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  REVIEW OF SYSTEMS:                                                    Patient has completed a [facility] Patient Health History, including a 14-system review, which was personally reviewed, and pertinent positives are listed in HPI. He denies any additional problems on the further questioning.  EXAM:                                                    VITAL SIGNS:   /68 (BP Location: Left arm, Patient Position: Sitting, Cuff Size: Adult Regular)  Pulse 67  Temp 98.9  F (37.2  C) (Oral)  Resp 16  Wt 85.7 kg (189 lb)  SpO2 98%  BMI 26.36 kg/m2  Mini-Cog Assessment:  Mini Cog Assessment  Clock Draw Score: 2 Normal  3 Item Recall: 3 objects recalled  Mini Cog Total Score: 5  Administered by: : Chris Benites MA    General: pt is in NAD, cooperative.  Skin: normal turgor, moist mucous membranes, no lesions/rashes noticed.  HEENT: ATNC, EOMI, PERRL, white sclera, normal conjunctiva, no nystagmus or ptosis. No carotid bruits bilaterally.  Respiratory: lung sounds clear to auscultation bilaterally, no crackles, wheezes, rhonchi. Symmetric lung excursion, no accessory respiratory muscle use.  Cardiovascular: normal S1/S2, no murmurs/rubs/gallops.   Abdomen: Not distended.  : deferred.    Neurological:  Mental: alert, follows  commands, Mini Cog Total Score: 5/5 with 3/3 on memory recall, no aphasia or dysarthria. Fund of knowledge is appropriate for age.  Cranial Nerves:  CN II: visual acuity - able to accurately count fingers with each eye. Visual fields intact, fundi: discs sharp, no papilledema and normal vessels bilaterally.  CN III, IV, VI: EOM intact, pupils equal and reactive  CN V: facial sensation nl  CN VII: face symmetric, no facial droop  CN VIII: hearing normal  CN IX: palate elevation symmetric, uvula at midline  CN XI SCM normal, shoulder shrug nl  CN XII: tongue midline  Motor: Strength: 5/5 in all major groups of all extremities. Normal tone. No abnormal movements. No pronator drift b/l.  Reflexes: Triceps, biceps, brachioradialis, patellar, and achilles reflexes normal and symmetric. No clonus noted. Toes are down-going b/l.   Sensory: temperature, light touch, pinprick, and vibration intact. Romberg: negative.  Coordination: FNF and heel-shin tests intact b/l. No dysdiadochokinesia with rapid alternating movements.  Gait:  Normal, able to tandem, toe, and heel walk.  DATA:   LABS/EMG/IMAGING/OTHER STUDIES: I reviewed pertinent medical records, including review of tabulated EMG report, prior neurology notes and Care Everywhere, as detailed in the history of present illness.  ASSESSMENT and PLAN:      ASSESSMENT: Bereket Perry is a 38 year old male patient with past medical history of bipolar disorder, Tourette's syndrome, RLS, and hiatal hernia, who presents with posttraumatic headaches of likely tension type since 2013.  They are better for the last 2 years.    We had a prolonged discussion with the patient regarding his symptoms.  His neurological exam today is non-focal.  I do not see any indications for brain MRI imaging at this point.  We might consider it later if his headache worsens.    We reviewed in detail available treatment options, including preventive ( antidepressants, Depakote, Topamax,  propranolol, and nutraceuticals) and acute therapies.  We also went over non-pharmacological headache prevention measures, that the patient wanted to try first.  He will contact my clinic if he wants to start any medications later.    Given the perceived improvement for the last 2 years, it is possible that his headaches might further improve or resolve.    DIAGNOSES:    ICD-10-CM    1. Chronic post-traumatic headache, not intractable G44.329      PLAN: At today's visit we thoroughly discussed various diagnostic possibilities for patient's symptoms that are likely related to posttraumatic tension type headaches.  Reviewed the available treatment options and the reasons for possible future work-up (brain MRI)    At the present time, he does not want to start any medications.    The patient was advised to return for additional evaluation if his headaches worsen in the future.    Reviewed non-pharmacological headache prevention measures include proper sleep hygiene, regular meals, adequate hydration, regular aerobic exercise, and stress reduction techniques.    Next follow-up appointment is on as needed basis.    I advised the patient to contact me with any questions or concerns.    Total Time:  61 minutes with > 50% spent counseling the patient on stated above assessment and recommendations, including nature of the diagnosis, possible future w/u, proposed plan, and prognosis.  Additional time was used to answer numerous patient's questions regarding his condition, treatment options, and the plan.    Santosh Issa MD  / Neurology  [place]  (Chart documentation was completed in part with Dragon voice-recognition software. Even though reviewed, some grammatical, spelling, and word errors may remain.)               Again, thank you for allowing me to participate in the care of your patient.        Sincerely,        Santosh Issa MD

## 2018-07-31 NOTE — PROGRESS NOTES
INITIAL NEUROLOGY CONSULTATION    DATE OF VISIT: 7/31/2018  CLINIC LOCATION: [facility]  MRN: [id number]  PATIENT NAME: Bereket Perry  YOB: 1980    PRIMARY CARE PROVIDER: Clyde Farr MD.    REASON FOR VISIT:   Chief Complaint   Patient presents with     Consult     headaches -post concussion 6 years ago     HISTORY OF PRESENT ILLNESS:                                                    Mr. Bereket Perry is 38 year old right handed male patient with past medical history of bipolar disorder, Tourette's syndrome, RLS, and hiatal hernia, who was seen in consultation today requested by Clyde Farr MD for posttraumatic headaches.    Per patient's report, he was in his usual state of health until September 2013, when after the head injury/possible concussion, he developed intermittent headache that assumed the chronic character.  He denies any significant recent changes or worsening of his headache characteristics.  In fact, he feels that it actually improved over the last 2 years.    Currently, almost constant pressure-like 1-10/10 headache affects 80% of his head, mainly bitemporal and bifrontal areas sparing the base of the head.  It fluctuates throughout the day.  It feels like swelling at the back of the head with occasional throbbing.  The headache is worse with sleep deprivation, loud environments, bright lights, computer screens, and overstimulation.  The headache is better with rest.  Associated symptoms include increased sensitivity to light and mild nausea without emesis, difficulty to concentrate, and fatigue.  No memory concerns.  The patient denies any associated focal neurological symptoms and intolerance of physical activity.  He does not need to be in dark quiet room when headache is severe.  He does not take any analgesics, and also did not try any headache preventive therapy in the past.    The patient reports that his sleep is poor.  He eats regular  meals.  His water intake consists of approximately 40 ounces of water.  No caffeine.  He describes his stress level as severe.    According to previous neurology notes and Care Everywhere, the patient was previously seen by Dr. Eddy at the [facility] for Tourette's syndrome and left eye pain.  He had EMG for bilateral hand numbness and tingling in 2014, which was normal.  He also was seen by Dr. Cleaning at [facility] [place] [place] [facility] with concerns of possible demyelinating disease and RLS.  The neurological exam at that time was normal.  Brain MRI was discussed, but deferred by patient.    No recent labs, except blood glucose which was normal in July 2018.    The patient denies a history of recent head injury. Prior neurological history: negative for stroke, brain neoplasms, seizure disorders, multiple sclerosis, meningitis, encephalitis, and major head injuries.  The patient reports the history of one seizure 6 weeks after birth without recurrence.  He also thinks that he possibly had more concussions without long-term sequelae because he played contact sports in school.    Neurologic Review of Systems - no amaurosis, diplopia, abnormal speech, unilateral numbness or weakness. He endorses multiple chronic complaints on 14-point comprehensive review of systems of the Patient Health History Form, that were reviewed and will be scanned into his electronic medical record at the end of this encounter. His primary care and other medical providers are aware and addressing all of them.  PAST MEDICAL/SURGICAL HISTORY:                                                    I personally reviewed patient's past medical and surgical history with the patient at today's visit.  Patient Active Problem List   Diagnosis     Allergic rhinitis     Bipolar disorder (H)     Tourette's disorder     History of keloid of skin     Heel pain     Muscle tension dysphonia     Vitamin D deficiency     Past Medical History:    Diagnosis Date     Allergic rhinitis, cause unspecified     Allergic rhinitis     Bipolar disorder, unspecified     Manic-depressive     Esophageal reflux     Gastroesophageal reflux     Hiatal hernia     s/p douglas     Tourette's disorder      Past Surgical History:   Procedure Laterality Date     CLAVICLE SURGERY       ESOPHAGEAL MOTILITY STUDY  12/17/13     ESOPHAGOSCOPY, GASTROSCOPY, DUODENOSCOPY (EGD), COMBINED  11/4/2013    Procedure: COMBINED ESOPHAGOSCOPY, GASTROSCOPY, DUODENOSCOPY (EGD), BIOPSY SINGLE OR MULTIPLE;;  Surgeon: Alex Erwin MD;  Location:  GI     ESOPHAGOSCOPY, GASTROSCOPY, DUODENOSCOPY (EGD), COMBINED  1/10/2014    Procedure: COMBINED ESOPHAGOSCOPY, GASTROSCOPY, DUODENOSCOPY (EGD);;  Surgeon: Daniel Gutiérrez MD;  Location:  OR      ESOPH/GAS REFLUX TEST W NASAL IMPED ELECTRODE  12/17/2013    Procedure: ESOPHAGEAL IMPEDENCE FUNCTION TEST 1 HOUR OR LESS;  Surgeon: Alex Erwin MD;  Location:  GI     LAPAROSCOPIC NISSEN FUNDOPLICATION  1/10/2014    Procedure: LAPAROSCOPIC NISSEN FUNDOPLICATION;  Laparoscopic Nissen Fundoplication,hiatal hernia repair,and Upper Endoscopy *Latex Allergy* ;  Surgeon: Daniel Gutiérrez MD;  Location: [facility]     OTHER SURGICAL HISTORY  5/2015    wrist surgery     SURGICAL HISTORY OF -   1995    LT wrist surgery     SURGICAL HISTORY OF -   1999    RT wrist surgery     MEDICATIONS:                                                    I personally reviewed patient's medications and allergies with the patient at today's visit.  The patient does not have any prescribed medications.  ALLERGIES:                                                      Allergies   Allergen Reactions     Aloe      Bee Venom      Latex Itching     Morphine      Synthetic also     Other [Seasonal Allergies]      Fleece       Percocet [Oxycodone-Acetaminophen]      percocet      Risperidone      Trouble swallowing     Sodium Lauryl Sulfate      Wool Fiber       No Clinical Screening - See Comments Rash     Contact dermatitis      FAMILY/SOCIAL HISTORY:                                                    Family and social history was reviewed with the patient at today's visit.  No family history of neurological disorders, except stroke.  Single, lives alone.  Former smoker.  Quit in 2003.  Denies use of alcohol and recreational drugs.  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  REVIEW OF SYSTEMS:                                                    Patient has completed a [facility] Patient Health History, including a 14-system review, which was personally reviewed, and pertinent positives are listed in HPI. He denies any additional problems on the further questioning.  EXAM:                                                    VITAL SIGNS:   /68 (BP Location: Left arm, Patient Position: Sitting, Cuff Size: Adult Regular)  Pulse 67  Temp 98.9  F (37.2  C) (Oral)  Resp 16  Wt 85.7 kg (189 lb)  SpO2 98%  BMI 26.36 kg/m2  Mini-Cog Assessment:  Mini Cog Assessment  Clock Draw Score: 2 Normal  3 Item Recall: 3 objects recalled  Mini Cog Total Score: 5  Administered by: : Chris Benites MA    General: pt is in NAD, cooperative.  Skin: normal turgor, moist mucous membranes, no lesions/rashes noticed.  HEENT: ATNC, EOMI, PERRL, white sclera, normal conjunctiva, no nystagmus or ptosis. No carotid bruits bilaterally.  Respiratory: lung sounds clear to auscultation bilaterally, no crackles, wheezes, rhonchi. Symmetric lung excursion, no accessory respiratory muscle use.  Cardiovascular: normal S1/S2, no murmurs/rubs/gallops.   Abdomen: Not distended.  : deferred.    Neurological:  Mental: alert, follows commands, Mini Cog Total Score: 5/5 with 3/3 on memory recall, no aphasia or dysarthria. Fund of knowledge is appropriate for age.  Cranial Nerves:  CN II: visual acuity - able to accurately count fingers with each eye. Visual fields intact, fundi: discs  sharp, no papilledema and normal vessels bilaterally.  CN III, IV, VI: EOM intact, pupils equal and reactive  CN V: facial sensation nl  CN VII: face symmetric, no facial droop  CN VIII: hearing normal  CN IX: palate elevation symmetric, uvula at midline  CN XI SCM normal, shoulder shrug nl  CN XII: tongue midline  Motor: Strength: 5/5 in all major groups of all extremities. Normal tone. No abnormal movements. No pronator drift b/l.  Reflexes: Triceps, biceps, brachioradialis, patellar, and achilles reflexes normal and symmetric. No clonus noted. Toes are down-going b/l.   Sensory: temperature, light touch, pinprick, and vibration intact. Romberg: negative.  Coordination: FNF and heel-shin tests intact b/l. No dysdiadochokinesia with rapid alternating movements.  Gait:  Normal, able to tandem, toe, and heel walk.  DATA:   LABS/EMG/IMAGING/OTHER STUDIES: I reviewed pertinent medical records, including review of tabulated EMG report, prior neurology notes and Care Everywhere, as detailed in the history of present illness.  ASSESSMENT and PLAN:      ASSESSMENT: Bereket Perry is a 38 year old male patient with past medical history of bipolar disorder, Tourette's syndrome, RLS, and hiatal hernia, who presents with posttraumatic headaches of likely tension type since 2013.  They are better for the last 2 years.    We had a prolonged discussion with the patient regarding his symptoms.  His neurological exam today is non-focal.  I do not see any indications for brain MRI imaging at this point.  We might consider it later if his headache worsens.    We reviewed in detail available treatment options, including preventive ( antidepressants, Depakote, Topamax, propranolol, and nutraceuticals) and acute therapies.  We also went over non-pharmacological headache prevention measures, that the patient wanted to try first.  He will contact my clinic if he wants to start any medications later.    Given the perceived improvement  for the last 2 years, it is possible that his headaches might further improve or resolve.    DIAGNOSES:    ICD-10-CM    1. Chronic post-traumatic headache, not intractable G44.329      PLAN: At today's visit we thoroughly discussed various diagnostic possibilities for patient's symptoms that are likely related to posttraumatic tension type headaches.  Reviewed the available treatment options and the reasons for possible future work-up (brain MRI)    At the present time, he does not want to start any medications.    The patient was advised to return for additional evaluation if his headaches worsen in the future.    Reviewed non-pharmacological headache prevention measures include proper sleep hygiene, regular meals, adequate hydration, regular aerobic exercise, and stress reduction techniques.    Next follow-up appointment is on as needed basis.    I advised the patient to contact me with any questions or concerns.    Total Time:  61 minutes with > 50% spent counseling the patient on stated above assessment and recommendations, including nature of the diagnosis, possible future w/u, proposed plan, and prognosis.  Additional time was used to answer numerous patient's questions regarding his condition, treatment options, and the plan.    Santosh Issa MD  / Neurology  [place]  (Chart documentation was completed in part with Dragon voice-recognition software. Even though reviewed, some grammatical, spelling, and word errors may remain.)

## 2018-07-31 NOTE — PATIENT INSTRUCTIONS
AFTER VISIT SUMMARY (AVS):    At today's visit we discussed various diagnostic possibilities for your symptoms that are likely related to posttraumatic tension type headaches, available treatment options, and the reasons for possible future work-up (brain MRI)    At the present time, you did not want to start any headache preventive medications.  Please return if you decide to try something in the future or your headache worsens.    Reviewed non-pharmacological headache prevention measures include proper sleep hygiene, regular meals, adequate hydration, regular aerobic exercise, and stress reduction techniques.    Additional recommendations after the work-up.    Next follow-up appointment is on as needed basis.    Please do not hesitate to call me with any questions or concerns.    Thanks.

## 2019-08-15 NOTE — NURSING NOTE
"Chief Complaint   Patient presents with     Headache     Insomnia       Initial /66  Pulse 75  Temp 98  F (36.7  C) (Oral)  Ht 5' 11.4\" (1.814 m)  Wt 155 lb 12.8 oz (70.7 kg)  SpO2 96%  BMI 21.49 kg/m2 Estimated body mass index is 21.49 kg/(m^2) as calculated from the following:    Height as of this encounter: 5' 11.4\" (1.814 m).    Weight as of this encounter: 155 lb 12.8 oz (70.7 kg).  Medication Reconciliation: complete   Michelle Reyes MA      " [id number] Detailed

## 2019-11-06 ENCOUNTER — HEALTH MAINTENANCE LETTER (OUTPATIENT)
Age: 39
End: 2019-11-06

## 2020-02-16 ENCOUNTER — HEALTH MAINTENANCE LETTER (OUTPATIENT)
Age: 40
End: 2020-02-16

## 2023-08-29 ENCOUNTER — HOSPITAL ENCOUNTER (OUTPATIENT)
Facility: HOSPITAL | Age: 43
End: 2023-08-29
Attending: UROLOGY | Admitting: UROLOGY
Payer: MEDICAID

## 2023-08-30 ENCOUNTER — OFFICE VISIT (OUTPATIENT)
Dept: FAMILY MEDICINE | Facility: CLINIC | Age: 43
End: 2023-08-30
Payer: MEDICAID

## 2023-08-30 ENCOUNTER — HOSPITAL ENCOUNTER (OUTPATIENT)
Dept: GENERAL RADIOLOGY | Facility: HOSPITAL | Age: 43
Discharge: HOME | End: 2023-08-30
Attending: PHYSICIAN ASSISTANT
Payer: MEDICAID

## 2023-08-30 ENCOUNTER — TELEPHONE (OUTPATIENT)
Dept: NURSING | Facility: CLINIC | Age: 43
End: 2023-08-30

## 2023-08-30 VITALS
WEIGHT: 167 LBS | TEMPERATURE: 97.6 F | SYSTOLIC BLOOD PRESSURE: 114 MMHG | OXYGEN SATURATION: 99 % | RESPIRATION RATE: 14 BRPM | HEART RATE: 55 BPM | BODY MASS INDEX: 23.29 KG/M2 | DIASTOLIC BLOOD PRESSURE: 71 MMHG

## 2023-08-30 DIAGNOSIS — T14.8XXA ABRASION: ICD-10-CM

## 2023-08-30 DIAGNOSIS — S92.334A CLOSED NONDISPLACED FRACTURE OF THIRD METATARSAL BONE OF RIGHT FOOT, INITIAL ENCOUNTER: Primary | ICD-10-CM

## 2023-08-30 PROCEDURE — 73630 X-RAY EXAM OF FOOT: CPT | Mod: RT

## 2023-08-30 PROCEDURE — 99204 OFFICE O/P NEW MOD 45 MIN: CPT | Performed by: PHYSICIAN ASSISTANT

## 2023-08-30 RX ORDER — CEFADROXIL 500 MG/1
500 CAPSULE ORAL 2 TIMES DAILY
Qty: 20 CAPSULE | Refills: 0 | Status: SHIPPED | OUTPATIENT
Start: 2023-08-30 | End: 2023-09-09

## 2023-08-30 RX ORDER — PRAMIPEXOLE DIHYDROCHLORIDE 1 MG/1
1 TABLET ORAL 3 TIMES DAILY
COMMUNITY
End: 2024-01-31

## 2023-08-30 NOTE — LETTER
August 31, 2023      Bereket Perry  [address] [place] [address]        To Whom It May Concern:    Bereket Perry  was seen on August 30.  He is to remain on crutches and non-weight bearing on his foot until seen by orthopedics for further instructions. Please accommodate these needs.        Sincerely,        Alexander Perez PA-C

## 2023-08-30 NOTE — PATIENT INSTRUCTIONS
Crutch ambulation nonweightbearing on the right lower extremity.  Use the cam walker boot when up and active.  He may be out of the boot for sleep and for hygiene.  Use of ibuprofen or aspirin for DVT prophylaxis while in the boot.  Antibiotic as written  Wash the wounds keep them clean dry and protected  Elevate the foot above the level of the heart with the toes above the nose is much as possible if not continuously for the first 2 days.  Follow-up with orthopedics for definitive evaluation and treatment of the fracture in the foot.
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
## 2023-08-31 ENCOUNTER — TELEPHONE (OUTPATIENT)
Dept: NURSING | Facility: CLINIC | Age: 43
End: 2023-08-31
Payer: MEDICAID

## 2023-08-31 ENCOUNTER — HOSPITAL ENCOUNTER (OUTPATIENT)
Dept: GENERAL RADIOLOGY | Facility: HOSPITAL | Age: 43
Discharge: HOME | End: 2023-08-31
Attending: NURSE PRACTITIONER
Payer: MEDICAID

## 2023-08-31 ENCOUNTER — OFFICE VISIT (OUTPATIENT)
Dept: FAMILY MEDICINE | Facility: CLINIC | Age: 43
End: 2023-08-31
Payer: MEDICAID

## 2023-08-31 ENCOUNTER — NURSE TRIAGE (OUTPATIENT)
Dept: NURSING | Facility: CLINIC | Age: 43
End: 2023-08-31
Payer: MEDICAID

## 2023-08-31 VITALS
BODY MASS INDEX: 23.29 KG/M2 | RESPIRATION RATE: 16 BRPM | HEART RATE: 57 BPM | OXYGEN SATURATION: 99 % | TEMPERATURE: 98 F | SYSTOLIC BLOOD PRESSURE: 111 MMHG | DIASTOLIC BLOOD PRESSURE: 69 MMHG | WEIGHT: 167 LBS

## 2023-08-31 DIAGNOSIS — M25.561 ACUTE PAIN OF RIGHT KNEE: ICD-10-CM
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
## 2023-08-31 DIAGNOSIS — S92.334A CLOSED NONDISPLACED FRACTURE OF THIRD METATARSAL BONE OF RIGHT FOOT, INITIAL ENCOUNTER: ICD-10-CM

## 2023-08-31 DIAGNOSIS — M25.532 LEFT WRIST PAIN: ICD-10-CM

## 2023-08-31 PROCEDURE — 99215 OFFICE O/P EST HI 40 MIN: CPT | Performed by: NURSE PRACTITIONER

## 2023-08-31 PROCEDURE — 73110 X-RAY EXAM OF WRIST: CPT | Mod: LT

## 2023-08-31 PROCEDURE — 73562 X-RAY EXAM OF KNEE 3: CPT | Mod: RT

## 2023-08-31 RX ORDER — HYDROCODONE BITARTRATE AND ACETAMINOPHEN 5; 325 MG/1; MG/1
1 TABLET ORAL EVERY 6 HOURS PRN
Qty: 10 TABLET | Refills: 0 | Status: SHIPPED | OUTPATIENT
Start: 2023-08-31 | End: 2023-09-03

## 2023-08-31 RX ORDER — HYDROXYZINE HYDROCHLORIDE 25 MG/1
25 TABLET, FILM COATED ORAL EVERY 6 HOURS PRN
Qty: 10 TABLET | Refills: 0 | Status: SHIPPED | OUTPATIENT
Start: 2023-08-31 | End: 2024-01-31

## 2023-08-31 NOTE — TELEPHONE ENCOUNTER
Called and spoke with patient. Medical Assistant informed patient letter has been written and will be placed at  for . No questions.    Qamar Isaac MA on 8/31/2023 at 10:23 AM
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
## 2023-08-31 NOTE — TELEPHONE ENCOUNTER
Triage Call:    Caller: Patient    Seen in clinic today for a broken toe.  Is calling to request work letter before tomorrow about restrictions.      From provider note at visit:  Patient was placed in a cam walker boot with nonweightbearing status and crutch ambulation.  Patient will follow-up with [facility] for definitive evaluation and treatment of the metatarsal fracture.     Advised he will need to contact clinic in the morning and it will take some time.  He can also request one when he follows up with orthopedics.  Advised he most likely shouldn't be working as is non weight bearing.    Caller verbalized understanding of instructions and questions answered.      Mary Park RN on 8/30/2023 at 7:04 PM

## 2023-08-31 NOTE — TELEPHONE ENCOUNTER
Patient was seen in  yesterday and has a broken foot. Patient is calling about pain medications and states that the tylenol and ibuprofen are ineffective for pain relief.  Patient declined triage. Writer advised that patient return to , describe his symptoms and request a stronger pain medication.  Patient verbalized understanding of care advice.  Viky Swanson RN on 8/31/2023 at 11:56 AM    Reason for Disposition    Follow-up information-only call to recent contact, no triage required    Protocols used: Information Only Call - No Triage-A-[place]
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
## 2023-08-31 NOTE — TELEPHONE ENCOUNTER
Patient was seen at the clinic yesterday.  He is calling today for a letter for work restrictions.  Patient warm transferred to the clinic. No triage.     KRISTI HOLLINS RN

## 2023-08-31 NOTE — TELEPHONE ENCOUNTER
2nd Call from Patient, first last night.    Seeking letter for employer - would like to know if any work restrictions or time needed to be away from work.     Patient is requesting call back when letter is ready for .  Would like to get letter today if at all possible.

## 2023-08-31 NOTE — PATIENT INSTRUCTIONS
Ice your foot and knee especially 20 minutes at a time up to once an hour for the next 2 days.  Try for at least 6 times per day.  Keep your foot elevated including when you are sleeping.    Continue to use the boot as recommended as well as crutches to avoid bearing weight for now.    Follow-up with [facility] as planned.    Ibuprofen 600 mg 4 times daily.    Hydrocodone only as needed.  Avoid extra Tylenol while you are taking this.  Take it with the hydroxyzine which reduces itching and may also help with pain and muscle spasm/nausea from the medication.

## 2023-09-05 ENCOUNTER — TRANSFERRED RECORDS (OUTPATIENT)
Dept: HEALTH INFORMATION MANAGEMENT | Facility: CLINIC | Age: 43
End: 2023-09-05
Payer: COMMERCIAL

## 2023-09-21 ENCOUNTER — OFFICE VISIT (OUTPATIENT)
Dept: INTERNAL MEDICINE | Facility: CLINIC | Age: 43
End: 2023-09-21
Payer: COMMERCIAL

## 2023-09-21 VITALS
TEMPERATURE: 97.6 F | BODY MASS INDEX: 23.38 KG/M2 | WEIGHT: 167 LBS | RESPIRATION RATE: 22 BRPM | OXYGEN SATURATION: 97 % | SYSTOLIC BLOOD PRESSURE: 119 MMHG | HEIGHT: 71 IN | DIASTOLIC BLOOD PRESSURE: 67 MMHG | HEART RATE: 74 BPM

## 2023-09-21 DIAGNOSIS — T14.8XXA ABRASION: Primary | ICD-10-CM

## 2023-09-21 PROCEDURE — 99213 OFFICE O/P EST LOW 20 MIN: CPT | Performed by: NURSE PRACTITIONER

## 2023-09-21 ASSESSMENT — PAIN SCALES - GENERAL: PAINLEVEL: MODERATE PAIN (5)

## 2023-09-21 ASSESSMENT — PATIENT HEALTH QUESTIONNAIRE - PHQ9
SUM OF ALL RESPONSES TO PHQ QUESTIONS 1-9: 2
10. IF YOU CHECKED OFF ANY PROBLEMS, HOW DIFFICULT HAVE THESE PROBLEMS MADE IT FOR YOU TO DO YOUR WORK, TAKE CARE OF THINGS AT HOME, OR GET ALONG WITH OTHER PEOPLE: NOT DIFFICULT AT ALL
SUM OF ALL RESPONSES TO PHQ QUESTIONS 1-9: 2
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
## 2023-09-21 NOTE — COMMUNITY RESOURCES LIST (ENGLISH)
09/21/2023   [facility]  N/A  For questions about this resource list or additional care needs, please contact your primary care clinic or care manager.  Phone: [phone]   Email: N/A   Address: [address] [place] [address]   Hours: N/A        Food and Nutrition       Food pantry  1  [facility] - Food Shelf Distance: 0.23 miles      Pickup   [address] [place] [address]  Language: English  Hours: Mon - Wed 9:30 AM - 2:30 PM  Fees: Free   Phone: (496) 186-7898 Email: pablo@[facility] Website: http://[facility].org/[facility]/[place]/     2  [facility] Distance: 1.46 miles      Pickup   [address] [place] [address]  Language: English  Hours: Mon 4:00 PM - 5:00 PM  Fees: Free   Phone: (880) 717-2786 Email: info@[employer] Website: http://www.[employer]brain     SNAP application assistance  3  [place] Department of Human Services - [employer] ([facility]) Distance: 6.81 miles      Phone/Virtual   [address] [address] [place] [address]  Language: English, Hmong, Rwandan, Palauan, Iraqi, Yi  Hours: Mon - Fri 9:00 AM - 5:00 PM  Fees: Free   Phone: (494) 393-5122 Website: https://[place].gov/dhs/people-we-serve/adults/economic-assistance/food-nutrition/programs-and-services/supplemental-nutrition-assistance-program.jsp     4  [employer] [place] Distance: 6.99 miles      Phone/Virtual   [address] [place] [address]  Language: English, Hmong, Rwandan, Palauan, Iraqi  Hours: Mon - Fri 8:30 AM - 4:30 PM  Fees: Free   Phone: (599) 258-1980 Email: helpline@[employer]ers[employer].org Website: https://www.hungersolutions.org/programs/[place]-food-helpline/     Soup kitchen or free meals  5  [facility] - [employer]aves [employer]ishes - Loaves and Fishes Distance: 3.34 miles      Pick   [address] [address]  Language:  English  Hours: Wed 5:30 PM - 6:30 PM  Fees: Free   Phone: (236) 657-4590 Email: office@[employer].org Website: https://www.EDAN.[employer] [place]     6  [facility] - Thursday Night Community Meal Distance: 3.42 miles      In-Person   [address] [place] [address]  Language: English, Kazakh  Hours: Thu 6:00 PM - 7:00 PM  Fees: Free   Phone: (356) 150-4514 Email: center@[employer]s.org Website: https://www.saintandrews.org          Important Numbers & Websites       Emergency Services   911  [facility] Services   311  Poison Control   (418) 553-3332  Suicide Prevention Lifeline   (227) 672-6134 (TALK)  Child Abuse Hotline   (731) 332-9451 (4-A-Child)  Sexual Assault Hotline   (988) 563-2923 (HOPE)  National Runaway Safeline   (103) 139-5460 (RUNAWAY)  All-Options Talkline   (602) 323-7511  Substance Abuse Referral   (380) 601-2648 (HELP)

## 2024-01-12 ENCOUNTER — TELEPHONE (OUTPATIENT)
Dept: URGENT CARE | Facility: URGENT CARE | Age: 44
End: 2024-01-12
Payer: COMMERCIAL

## 2024-01-12 NOTE — TELEPHONE ENCOUNTER
FYI - Status Update    Who is Calling: patient    Update: pt looking to be added to dr. Julius murillo's list of patients, his friend, kayla kramer, is a current pt and gave a recommendation for pt to see him also, but I did let the pt know he isn't accepting new pts at the moment     Does caller want a call/response back: Yes     Could we send this information to you in [facility] or would you prefer to receive a phone call?:   Patient would prefer a phone call     Okay to leave a detailed message?: Yes at Cell number on file:    Telephone Information:   Mobile [phone]

## 2024-01-12 NOTE — TELEPHONE ENCOUNTER
I am not taking new patients into my practice at this time and do not anticipate any changes in the near future.    Julius Ernst MD  General Internal Medicine  [facility]  1/12/2024, 3:08 PM

## 2024-01-31 ENCOUNTER — OFFICE VISIT (OUTPATIENT)
Dept: FAMILY MEDICINE | Facility: CLINIC | Age: 44
End: 2024-01-31
Payer: COMMERCIAL

## 2024-01-31 VITALS
DIASTOLIC BLOOD PRESSURE: 77 MMHG | OXYGEN SATURATION: 99 % | HEART RATE: 80 BPM | RESPIRATION RATE: 16 BRPM | HEIGHT: 71 IN | BODY MASS INDEX: 23.43 KG/M2 | WEIGHT: 167.38 LBS | SYSTOLIC BLOOD PRESSURE: 127 MMHG | TEMPERATURE: 98.3 F

## 2024-01-31 DIAGNOSIS — J02.9 SORE THROAT: ICD-10-CM

## 2024-01-31 DIAGNOSIS — Z01.818 PREOP GENERAL PHYSICAL EXAM: Primary | ICD-10-CM

## 2024-01-31 DIAGNOSIS — R39.198 SLOW URINARY STREAM: ICD-10-CM

## 2024-01-31 LAB
DEPRECATED S PYO AG THROAT QL EIA: NEGATIVE
ERYTHROCYTE [DISTWIDTH] IN BLOOD BY AUTOMATED COUNT: 12 % (ref 10–15)
GROUP A STREP BY PCR: NOT DETECTED
HCT VFR BLD AUTO: 48 % (ref 40–53)
HGB BLD-MCNC: 16.1 G/DL (ref 13.3–17.7)
MCH RBC QN AUTO: 29.7 PG (ref 26.5–33)
MCHC RBC AUTO-ENTMCNC: 33.5 G/DL (ref 31.5–36.5)
MCV RBC AUTO: 88 FL (ref 78–100)
PLATELET # BLD AUTO: 184 10E3/UL (ref 150–450)
RBC # BLD AUTO: 5.43 10E6/UL (ref 4.4–5.9)
WBC # BLD AUTO: 6.1 10E3/UL (ref 4–11)

## 2024-01-31 PROCEDURE — 87651 STREP A DNA AMP PROBE: CPT | Performed by: FAMILY MEDICINE

## 2024-01-31 PROCEDURE — 99213 OFFICE O/P EST LOW 20 MIN: CPT | Performed by: FAMILY MEDICINE

## 2024-01-31 PROCEDURE — 36415 COLL VENOUS BLD VENIPUNCTURE: CPT | Performed by: FAMILY MEDICINE

## 2024-01-31 PROCEDURE — 80048 BASIC METABOLIC PNL TOTAL CA: CPT | Performed by: FAMILY MEDICINE

## 2024-01-31 PROCEDURE — 85027 COMPLETE CBC AUTOMATED: CPT | Performed by: FAMILY MEDICINE

## 2024-01-31 RX ORDER — TAMSULOSIN HYDROCHLORIDE 0.4 MG/1
CAPSULE ORAL
Status: ON HOLD | COMMUNITY
Start: 2023-07-24 | End: 2024-02-09

## 2024-01-31 NOTE — COMMUNITY RESOURCES LIST (ENGLISH)
01/31/2024   [facility]  N/A  For questions about this resource list or additional care needs, please contact your primary care clinic or care manager.  Phone: [phone]   Email: N/A   Address: [address] [place] [address]   Hours: N/A        Financial Stability       Utility payment assistance  1  [facility] Distance: 0.23 miles      Phone/Virtual   [address] [place] [address]  Language: English  Hours: Mon - Fri 9:00 AM - 4:00 PM , Sun 9:30 AM - 12:00 PM  Fees: Free   Phone: (534) 842-7530 Email: pablo@[facility] [place].[facility] Website: http://[facility].org/[facility]/[place]/     2  [place] [employer] [facility] [place]'s Telephone Assistance Plan (TAP) and Federal Lifeline and Affordable Connectivity Program (ACP) Distance: 4.72 miles      Phone/Virtual   [address] [place] [address]  Language: English  Fees: Free   Phone: (861) 142-2984 Email: consumer.natalia@[facility] Website: https://[place].gov/puc/consumers/telephone/          Hotlines and Helplines       Hotline - Housing crisis  3  [facility] Distance: 13.37 miles      Phone/Virtual   [address] [place] [address]  Language: English  Hours: Mon - Sun Open 24 Hours   Phone: (808) 275-4610 Email: communications@[facility]-[place].org Website: https://oscs-[place].org/oursaviourshousing/     4  [facility] Distance: 14.91 miles      Phone/Virtual   [address] [place] [address]  Language: English  Hours: Mon - Sun Open 24 Hours   Phone: (344) 767-5738 Email: info@[facility].org Website: http://www.cornerstonemn.org          Housing       Coordinated Entry access point  5  [facility] Distance: 7.51 miles      In-Person, Phone/Virtual   [address] [place] [address]  Language: English  Hours: Mon - Fri 8:30 AM - 4:30 PM  Fees: Free   Phone:  (572) 973-2524 Email: info@[facility].org Website: https://www.[facility].org/locations/[facility]-clinic/     6  [facility] and Human [facility] - Coordinated Access to Housing and Shelter (CAHS) - Coordinated Access - Coordinated Entry access point Distance: 8.92 miles      In-Person, Phone/Virtual   [address] [place] [address]  Language: English  Hours: Mon - Fri 8:00 AM - 4:30 PM  Fees: Free   Phone: (616) 640-9952 Website: https://www.[facility]./residents/assistance-support/assistance/housing-services-support     Drop-in center or day shelter  7  [facility] Distance: 6.07 miles      In-Person   [address] [place] [address]  Language: English  Hours: Mon - Fri 9:00 AM - 4:00 PM  Fees: Free   Phone: (495) 594-4422 Email: gennarok@[employer] Website: http://Friend Trusted     8  [facility] Distance: 13.25 miles      In-Person   [address] [place] [address]  Language: English, Barbadian, Romansh  Hours: Mon - Sat 7:00 AM - 3:00 PM  Fees: Free, Self Pay   Phone: (534) 937-2592 Email: info@[employer] Website: https://www.[employer].org/locations/opportunity-center/     Housing search assistance  9  [facility] - Housing Search Assistance Distance: 7.84 miles      Phone/Virtual   [address] [place] [address]  Language: Mohawk, English, Hmong, Anabela, Barbadian, Romansh  Hours: Mon - Fri Appt. Only  Fees: Free   Phone: (507) 963-1241 Website: https://neighborhoodGoLocal24.org/     10  [facility] Adult Mental Health [place] - Mental Health Crisis Housing Search Assistance Distance: 9.9 miles      In-Person, Phone/Virtual   [address] [place] [address]  Language: English  Hours: Mon - Tue 8:00 AM - 4:30 PM , Wed 8:00 AM - 6:00 PM , Thu - Fri 8:00 AM - 4:30 PM  Fees: Free   Phone: (865) 807-7442 Email: [facility]@co.[place]. Website:  https://www.[facility]./residents/health-medical/clinics-services/mental-health/adult-mental-health     Shelter for families  11  [facility] Distance: 8.6 miles      In-Person   [address] [facility] [place] [address]  Language: English  Hours: Mon - Fri 3:00 PM - 9:00 AM , Sat - Sun Open 24 Hours  Fees: Free   Phone: (948) 755-4852 Ext.1 Website: https://www.saintHolts.[employer]/2020/07/03/emergency-family-shelter/     12  [facility] Distance: 23.37 miles      In-Person   [address] [place] [address]  Language: English  Hours: Mon - Sun Open 24 Hours  Fees: Free, Self Pay   Phone: (668) 110-3068 Email: Tere@[facility] Website: http://www.[facility].org/     Shelter for individuals  13  [facility] - Housing Help Distance: 7.32 miles      Phone/Virtual   [address] [place] [address]  Language: English  Hours: Mon - Fri 8:00 AM - 5:00 PM   Phone: (544) 885-5002 Email: [place].[facility]. Website: https://[facility].org/     14  [facility] [place] [facility] - [facility] [place] [facility] Distance: 7.53 miles      In-Person   [address] [place] [address]  Language: English  Hours: Mon - Sun 5:00 PM - 10:00 AM  Fees: Free, Self Pay   Phone: (538) 134-7033 Email: info@[employer].org Website: https://www.[employer].org/locations/[facility]-[place]/          Important Numbers & Websites       Emergency Services   911  City Services   311  Poison Control   (829) 134-8805  Suicide Prevention Lifeline   (462) 717-7844 (TALK)  Child Abuse Hotline   (510) 835-9561 (4-A-Child)  Sexual Assault Hotline   (555) 545-1881 (HOPE)  National Runaway Safeline   (646) 123-0007 (RUNAWAY)  All-Options Talkline   (201) 368-6110  Substance Abuse Referral   (949) 220-4592 (HELP)

## 2024-01-31 NOTE — PROGRESS NOTES
Preoperative Evaluation  [facility]  [address] [facility] [address]  Phone: [phone]  Fax: [phone]  Primary Provider: No Ref-Primary, Physician  Pre-op Performing Provider: ANIL SUMMERS  Jan 31, 2024       Bereket is a 43 year old, presenting for the following:  Pre-Op Exam        1/31/2024    11:17 AM   Additional Questions   Roomed by Roseline MAC CMA   Accompanied by Self     Surgical Information  Surgery/Procedure: Cystoscopy  Surgery Location: [facility]  Surgeon: Dr. Thomas  Surgery Date: 2/9/24  Time of Surgery: TBD  Where patient plans to recover: At home with family or friend  Fax number for surgical facility: [phone]    Assessment & Plan     The proposed surgical procedure is considered LOW risk.    Preop general physical exam  2. Slow urinary stream  - Basic metabolic panel  (Ca, Cl, CO2, Creat, Gluc, K, Na, BUN); Future  - CBC with platelets; Future     - No identified additional risk factors other than previously addressed    Antiplatelet or Anticoagulation Medication Instructions   - Patient is on no antiplatelet or anticoagulation medications.    Additional Medication Instructions  Patient is to take all scheduled medications on the day of surgery    Recommendation  APPROVAL GIVEN to proceed with proposed procedure, without further diagnostic evaluation.    3. Sore throat  - Streptococcus A Rapid Screen w/Reflex to PCR - Clinic Collect  - Group A Streptococcus PCR Throat Swab     Rapid strep negative. Continue symptomatic management, avoid NSAIDS before surgery.    Subjective       HPI related to upcoming procedure:     CYSTOSCOPY: working up slow urinary stream. Working up with [facility]. Scheduled for cystoscopy.            1/31/2024    11:29 AM   Preop Questions   1. Have you ever had a heart attack or stroke? No   2. Have you ever had surgery on your heart or blood vessels, such as a stent placement, a coronary artery bypass, or  surgery on an artery in your head, neck, heart, or legs? No   3. Do you have chest pain with activity? No   4. Do you have a history of  heart failure? No   5. Do you currently have a cold, bronchitis or symptoms of other infection? YES - mild URI symptoms.    6. Do you have a cough, shortness of breath, or wheezing? YES - mild URI symptoms.    7. Do you or anyone in your family have previous history of blood clots? UNKNOWN    8. Do you or does anyone in your family have a serious bleeding problem such as prolonged bleeding following surgeries or cuts? No   9. Have you ever had problems with anemia or been told to take iron pills? No   10. Have you had any abnormal blood loss such as black, tarry or bloody stools? YES -  reports part of the work up right now.    11. Have you ever had a blood transfusion? No   12. Are you willing to have a blood transfusion if it is medically needed before, during, or after your surgery? UNSURE - needs more time to think about this.    13. Have you or any of your relatives ever had problems with anesthesia? YES - had issues with opioid medications, slower to wake up, itching. Has done well with most of his procedures.    14. Do you have sleep apnea, excessive snoring or daytime drowsiness? No   15. Do you have any artifical heart valves or other implanted medical devices like a pacemaker, defibrillator, or continuous glucose monitor? No   16. Do you have artificial joints? UNKNOWN - has metal plates from prior injuries    17. Are you allergic to latex? YES: itching        Health Care Directive  Patient does not have a Health Care Directive or Living Will: Patient states has Advance Directive and will bring in a copy to clinic.    Preoperative Review of    reviewed - no active prescriptions       Status of Chronic Conditions:  See problem list for active medical problems.  Problems all longstanding and stable, except as noted/documented.  See ROS for pertinent symptoms related  to these conditions.    Patient Active Problem List    Diagnosis Date Noted    Vitamin D deficiency 07/25/2017     Priority: Medium    Muscle tension dysphonia 03/05/2014     Priority: Medium    Heel pain 05/10/2013     Priority: Medium    History of keloid of skin 06/06/2012     Priority: Medium     History of keloid        Allergic rhinitis 03/01/2005     Priority: Medium     Allergic rhinitis  Problem list name updated by automated process. Provider to review      Bipolar disorder (H) 03/01/2005     Priority: Medium     Manic-depressive  Problem list name updated by automated process. Provider to review      Tourette's disorder 03/01/2005     Priority: Medium      Past Medical History:   Diagnosis Date    Allergic rhinitis, cause unspecified     Allergic rhinitis    Bipolar disorder, unspecified (H)     Manic-depressive    Esophageal reflux     Gastroesophageal reflux    Hiatal hernia     s/p douglas    Tourette's disorder      Past Surgical History:   Procedure Laterality Date    CLAVICLE SURGERY      ESOPHAGEAL MOTILITY STUDY  12/17/13    ESOPHAGOSCOPY, GASTROSCOPY, DUODENOSCOPY (EGD), COMBINED  11/4/2013    Procedure: COMBINED ESOPHAGOSCOPY, GASTROSCOPY, DUODENOSCOPY (EGD), BIOPSY SINGLE OR MULTIPLE;;  Surgeon: Alex Erwin MD;  Location: [facility]    ESOPHAGOSCOPY, GASTROSCOPY, DUODENOSCOPY (EGD), COMBINED  1/10/2014    Procedure: COMBINED ESOPHAGOSCOPY, GASTROSCOPY, DUODENOSCOPY (EGD);;  Surgeon: Daniel Gutiérrez MD;  Location:  OR     ESOPH/GAS REFLUX TEST W NASAL IMPED ELECTRODE  12/17/2013    Procedure: ESOPHAGEAL IMPEDENCE FUNCTION TEST 1 HOUR OR LESS;  Surgeon: Alex Erwin MD;  Location: [facility]    LAPAROSCOPIC NISSEN FUNDOPLICATION  1/10/2014    Procedure: LAPAROSCOPIC NISSEN FUNDOPLICATION;  Laparoscopic Nissen Fundoplication,hiatal hernia repair,and Upper Endoscopy *Latex Allergy* ;  Surgeon: Daniel Gutiérrez MD;  Location:  OR    OTHER SURGICAL HISTORY  5/2015     wrist surgery    SURGICAL HISTORY OF -       LT wrist surgery    SURGICAL HISTORY OF -       RT wrist surgery    WRIST SURGERY Right     x3    WRIST SURGERY Left      Current Outpatient Medications   Medication Sig Dispense Refill    tamsulosin (FLOMAX) 0.4 MG capsule TAKE 1 CAPSULE BY MOUTH DAILY 30 MINUTES BEFORE A MEAL      hydrOXYzine (ATARAX) 25 MG tablet Take 1 tablet (25 mg) by mouth every 6 hours as needed for itching (Take with doses of hydrocodone) (Patient not taking: Reported on 2023) 10 tablet 0    pramipexole (MIRAPEX) 1 MG tablet Take 1 mg by mouth 3 times daily (Patient not taking: Reported on 2023)         Allergies   Allergen Reactions    Aloe     Bee Venom     Latex Itching    Morphine      Synthetic also    Other [Seasonal Allergies]      Fleece      Percocet [Oxycodone-Acetaminophen]      percocet     Risperidone      Trouble swallowing    Sodium Lauryl Sulfate     Wool Fiber     Bee Pollen Rash    Other [No Clinical Screening - See Comments] Rash     Contact dermatitis         Social History     Tobacco Use    Smoking status: Former     Packs/day: 1.00     Years: 8.00     Additional pack years: 0.00     Total pack years: 8.00     Types: Cigarettes     Start date: 1995     Quit date: 2003     Years since quittin.6    Smokeless tobacco: Never   Substance Use Topics    Alcohol use: No     Family History   Problem Relation Age of Onset    Arthritis Mother     Depression Mother     Eye Disorder Mother     Psychotic Disorder Mother     Obesity Mother     Respiratory Brother         asthma    Allergies Brother     Allergies Father     Asthma Father     Hypertension Father     Obesity Father     Hearing Loss Father     Colon Cancer Father     Arthritis Paternal Grandmother     Eye Disorder Paternal Grandmother     Diabetes Paternal Grandfather     Heart Disease Paternal Grandfather     Hypertension Paternal Grandfather     Allergies Brother      History   Drug Use No  "        Review of Systems    Review of Systems  Constitutional, HEENT, cardiovascular, pulmonary, GI, , musculoskeletal, neuro, skin, endocrine and psych systems are negative, except as otherwise noted.    Objective    /77 (BP Location: Left arm, Patient Position: Sitting, Cuff Size: Adult Regular)   Pulse 80   Temp 98.3  F (36.8  C) (Oral)   Resp 16   Ht 1.803 m (5' 11\")   Wt 75.9 kg (167 lb 6 oz)   SpO2 99%   BMI 23.34 kg/m     Estimated body mass index is 23.34 kg/m  as calculated from the following:    Height as of this encounter: 1.803 m (5' 11\").    Weight as of this encounter: 75.9 kg (167 lb 6 oz).  Physical Exam  GENERAL: alert and no distress  EYES: Eyes grossly normal to inspection, PERRL and conjunctivae and sclerae normal  HENT: ear canals and TM's normal, nose and mouth without ulcers or lesions  NECK: no adenopathy, no asymmetry, masses, or scars  RESP: lungs clear to auscultation - no rales, rhonchi or wheezes  CV: regular rate and rhythm, normal S1 S2, no S3 or S4, no murmur, click or rub, no peripheral edema  ABDOMEN: soft, nontender, no hepatosplenomegaly, no masses and bowel sounds normal  MS: no gross musculoskeletal defects noted, no edema  SKIN: no suspicious lesions or rashes  NEURO: Normal strength and tone, mentation intact and speech normal  PSYCH: mentation appears normal, affect normal/bright  LYMPH: no cervical, supraclavicular, axillary, or inguinal adenopathy    No results for input(s): \"HGB\", \"PLT\", \"INR\", \"NA\", \"POTASSIUM\", \"CR\", \"A1C\" in the last 08349 hours.     Diagnostics    Recent Results (from the past 240 hour(s))   Streptococcus A Rapid Screen w/Reflex to PCR - Clinic Collect    Collection Time: 01/31/24 12:15 PM    Specimen: Throat; Swab   Result Value Ref Range    Group A Strep antigen Negative Negative   Group A Streptococcus PCR Throat Swab    Collection Time: 01/31/24 12:15 PM    Specimen: Throat; Swab   Result Value Ref Range    Group A strep by PCR Not " Detected Not Detected   Basic metabolic panel  (Ca, Cl, CO2, Creat, Gluc, K, Na, BUN)    Collection Time: 01/31/24 12:21 PM   Result Value Ref Range    Sodium 139 135 - 145 mmol/L    Potassium 4.1 3.4 - 5.3 mmol/L    Chloride 104 98 - 107 mmol/L    Carbon Dioxide (CO2) 26 22 - 29 mmol/L    Anion Gap 9 7 - 15 mmol/L    Urea Nitrogen 12.6 6.0 - 20.0 mg/dL    Creatinine 1.10 0.67 - 1.17 mg/dL    GFR Estimate 86 >60 mL/min/1.73m2    Calcium 9.2 8.6 - 10.0 mg/dL    Glucose 61 (L) 70 - 99 mg/dL   CBC with platelets    Collection Time: 01/31/24 12:21 PM   Result Value Ref Range    WBC Count 6.1 4.0 - 11.0 10e3/uL    RBC Count 5.43 4.40 - 5.90 10e6/uL    Hemoglobin 16.1 13.3 - 17.7 g/dL    Hematocrit 48.0 40.0 - 53.0 %    MCV 88 78 - 100 fL    MCH 29.7 26.5 - 33.0 pg    MCHC 33.5 31.5 - 36.5 g/dL    RDW 12.0 10.0 - 15.0 %    Platelet Count 184 150 - 450 10e3/uL        No EKG required for low risk surgery (cataract, skin procedure, breast biopsy, etc).    Revised Cardiac Risk Index (RCRI)  The patient has the following serious cardiovascular risks for perioperative complications:   - No serious cardiac risks = 0 points     RCRI Interpretation: 0 points: Class I (very low risk - 0.4% complication rate)         Signed Electronically by: Mini Lawton DO  Copy of this evaluation report is provided to requesting physician.

## 2024-01-31 NOTE — PATIENT INSTRUCTIONS
Preparing for Your Surgery  Getting started  A nurse will call you to review your health history and instructions. They will give you an arrival time based on your scheduled surgery time. Please be ready to share:  Your doctor's clinic name and phone number  Your medical, surgical, and anesthesia history  A list of allergies and sensitivities  A list of medicines, including herbal treatments and over-the-counter drugs  Whether the patient has a legal guardian (ask how to send us the papers in advance)  Please tell us if you're pregnant--or if there's any chance you might be pregnant. Some surgeries may injure a fetus (unborn baby), so they require a pregnancy test. Surgeries that are safe for a fetus don't always need a test, and you can choose whether to have one.   If you have a child who's having surgery, please ask for a copy of Preparing for Your Child's Surgery.    Preparing for surgery  Within 10 to 30 days of surgery: Have a pre-op exam (sometimes called an H&P, or History and Physical). This can be done at a clinic or pre-operative center.  If you're having a , you may not need this exam. Talk to your care team.  At your pre-op exam, talk to your care team about all medicines you take. If you need to stop any medicines before surgery, ask when to start taking them again.  We do this for your safety. Many medicines can make you bleed too much during surgery. Some change how well surgery (anesthesia) drugs work.  Call your insurance company to let them know you're having surgery. (If you don't have insurance, call [phone].)  Call your clinic if there's any change in your health. This includes signs of a cold or flu (sore throat, runny nose, cough, rash, fever). It also includes a scrape or scratch near the surgery site.  If you have questions on the day of surgery, call your hospital or surgery center.  Eating and drinking guidelines  For your safety: Unless your surgeon tells you otherwise,  follow the guidelines below.  Eat and drink as usual until 8 hours before you arrive for surgery. After that, no food or milk.  Drink clear liquids until 2 hours before you arrive. These are liquids you can see through, like water, Gatorade, and Propel Water. They also include plain black coffee and tea (no cream or milk), candy, and breath mints. You can spit out gum when you arrive.  If you drink alcohol: Stop drinking it the night before surgery.  If your care team tells you to take medicine on the morning of surgery, it's okay to take it with a sip of water.  Preventing infection  Shower or bathe the night before and morning of your surgery. Follow the instructions your clinic gave you. (If no instructions, use regular soap.)  Don't shave or clip hair near your surgery site. We'll remove the hair if needed.  Don't smoke or vape the morning of surgery. You may chew nicotine gum up to 2 hours before surgery. A nicotine patch is okay.  Note: Some surgeries require you to completely quit smoking and nicotine. Check with your surgeon.  Your care team will make every effort to keep you safe from infection. We will:  Clean our hands often with soap and water (or an alcohol-based hand rub).  Clean the skin at your surgery site with a special soap that kills germs.  Give you a special gown to keep you warm. (Cold raises the risk of infection.)  Wear special hair covers, masks, gowns and gloves during surgery.  Give antibiotic medicine, if prescribed. Not all surgeries need antibiotics.  What to bring on the day of surgery  Photo ID and insurance card  Copy of your health care directive, if you have one  Glasses and hearing aids (bring cases)  You can't wear contacts during surgery  Inhaler and eye drops, if you use them (tell us about these when you arrive)  CPAP machine or breathing device, if you use them  A few personal items, if spending the night  If you have . . .  A pacemaker, ICD (cardiac defibrillator) or other  implant: Bring the ID card.  An implanted stimulator: Bring the remote control.  A legal guardian: Bring a copy of the certified (court-stamped) guardianship papers.  Please remove any jewelry, including body piercings. Leave jewelry and other valuables at home.  If you're going home the day of surgery  You must have a responsible adult drive you home. They should stay with you overnight as well.  If you don't have someone to stay with you, and you aren't safe to go home alone, we may keep you overnight. Insurance often won't pay for this.  After surgery  If it's hard to control your pain or you need more pain medicine, please call your surgeon's office.  Questions?   If you have any questions for your care team, list them here: _________________________________________________________________________________________________________________________________________________________________________ ____________________________________ ____________________________________ ____________________________________  For informational purposes only. Not to replace the advice of your health care provider. Copyright   2003, 2019 [employer] [facility], [place]. All rights reserved. Clinically reviewed by Rossi Morel MD. [employer] 900913 - REV 12/22.    How to Take Your Medication Before Surgery  - Take all of your medications before surgery as usual

## 2024-01-31 NOTE — RESULT ENCOUNTER NOTE
Patient updated by [employer] message with lab results.       Strep test negative.  Mini Lawton, DO

## 2024-01-31 NOTE — H&P (VIEW-ONLY)
Preoperative Evaluation  [facility]  [address] [facility] [address]  Phone: [phone]  Fax: [phone]  Primary Provider: No Ref-Primary, Physician  Pre-op Performing Provider: ANIL SUMMERS  Jan 31, 2024       Bereket is a 43 year old, presenting for the following:  Pre-Op Exam        1/31/2024    11:17 AM   Additional Questions   Roomed by Roseline MAC CMA   Accompanied by Self     Surgical Information  Surgery/Procedure: Cystoscopy  Surgery Location: [facility]  Surgeon: Dr. Thomas  Surgery Date: 2/9/24  Time of Surgery: TBD  Where patient plans to recover: At home with family or friend  Fax number for surgical facility: [phone]    Assessment & Plan     The proposed surgical procedure is considered LOW risk.    Preop general physical exam  2. Slow urinary stream  - Basic metabolic panel  (Ca, Cl, CO2, Creat, Gluc, K, Na, BUN); Future  - CBC with platelets; Future     - No identified additional risk factors other than previously addressed    Antiplatelet or Anticoagulation Medication Instructions   - Patient is on no antiplatelet or anticoagulation medications.    Additional Medication Instructions  Patient is to take all scheduled medications on the day of surgery    Recommendation  APPROVAL GIVEN to proceed with proposed procedure, without further diagnostic evaluation.    3. Sore throat  - Streptococcus A Rapid Screen w/Reflex to PCR - Clinic Collect  - Group A Streptococcus PCR Throat Swab     Rapid strep negative. Continue symptomatic management, avoid NSAIDS before surgery.    Subjective       HPI related to upcoming procedure:     CYSTOSCOPY: working up slow urinary stream. Working up with [facility]. Scheduled for cystoscopy.            1/31/2024    11:29 AM   Preop Questions   1. Have you ever had a heart attack or stroke? No   2. Have you ever had surgery on your heart or blood vessels, such as a stent placement, a coronary artery bypass, or  surgery on an artery in your head, neck, heart, or legs? No   3. Do you have chest pain with activity? No   4. Do you have a history of  heart failure? No   5. Do you currently have a cold, bronchitis or symptoms of other infection? YES - mild URI symptoms.    6. Do you have a cough, shortness of breath, or wheezing? YES - mild URI symptoms.    7. Do you or anyone in your family have previous history of blood clots? UNKNOWN    8. Do you or does anyone in your family have a serious bleeding problem such as prolonged bleeding following surgeries or cuts? No   9. Have you ever had problems with anemia or been told to take iron pills? No   10. Have you had any abnormal blood loss such as black, tarry or bloody stools? YES -  reports part of the work up right now.    11. Have you ever had a blood transfusion? No   12. Are you willing to have a blood transfusion if it is medically needed before, during, or after your surgery? UNSURE - needs more time to think about this.    13. Have you or any of your relatives ever had problems with anesthesia? YES - had issues with opioid medications, slower to wake up, itching. Has done well with most of his procedures.    14. Do you have sleep apnea, excessive snoring or daytime drowsiness? No   15. Do you have any artifical heart valves or other implanted medical devices like a pacemaker, defibrillator, or continuous glucose monitor? No   16. Do you have artificial joints? UNKNOWN - has metal plates from prior injuries    17. Are you allergic to latex? YES: itching        Health Care Directive  Patient does not have a Health Care Directive or Living Will: Patient states has Advance Directive and will bring in a copy to clinic.    Preoperative Review of    reviewed - no active prescriptions       Status of Chronic Conditions:  See problem list for active medical problems.  Problems all longstanding and stable, except as noted/documented.  See ROS for pertinent symptoms related  to these conditions.    Patient Active Problem List    Diagnosis Date Noted    Vitamin D deficiency 07/25/2017     Priority: Medium    Muscle tension dysphonia 03/05/2014     Priority: Medium    Heel pain 05/10/2013     Priority: Medium    History of keloid of skin 06/06/2012     Priority: Medium     History of keloid        Allergic rhinitis 03/01/2005     Priority: Medium     Allergic rhinitis  Problem list name updated by automated process. Provider to review      Bipolar disorder (H) 03/01/2005     Priority: Medium     Manic-depressive  Problem list name updated by automated process. Provider to review      Tourette's disorder 03/01/2005     Priority: Medium      Past Medical History:   Diagnosis Date    Allergic rhinitis, cause unspecified     Allergic rhinitis    Bipolar disorder, unspecified (H)     Manic-depressive    Esophageal reflux     Gastroesophageal reflux    Hiatal hernia     s/p douglas    Tourette's disorder      Past Surgical History:   Procedure Laterality Date    CLAVICLE SURGERY      ESOPHAGEAL MOTILITY STUDY  12/17/13    ESOPHAGOSCOPY, GASTROSCOPY, DUODENOSCOPY (EGD), COMBINED  11/4/2013    Procedure: COMBINED ESOPHAGOSCOPY, GASTROSCOPY, DUODENOSCOPY (EGD), BIOPSY SINGLE OR MULTIPLE;;  Surgeon: Alex Erwin MD;  Location: [facility]    ESOPHAGOSCOPY, GASTROSCOPY, DUODENOSCOPY (EGD), COMBINED  1/10/2014    Procedure: COMBINED ESOPHAGOSCOPY, GASTROSCOPY, DUODENOSCOPY (EGD);;  Surgeon: Daniel Gutiérrez MD;  Location:  OR     ESOPH/GAS REFLUX TEST W NASAL IMPED ELECTRODE  12/17/2013    Procedure: ESOPHAGEAL IMPEDENCE FUNCTION TEST 1 HOUR OR LESS;  Surgeon: Alex Erwin MD;  Location: [facility]    LAPAROSCOPIC NISSEN FUNDOPLICATION  1/10/2014    Procedure: LAPAROSCOPIC NISSEN FUNDOPLICATION;  Laparoscopic Nissen Fundoplication,hiatal hernia repair,and Upper Endoscopy *Latex Allergy* ;  Surgeon: Daniel Gutiérrez MD;  Location:  OR    OTHER SURGICAL HISTORY  5/2015     wrist surgery    SURGICAL HISTORY OF -       LT wrist surgery    SURGICAL HISTORY OF -       RT wrist surgery    WRIST SURGERY Right     x3    WRIST SURGERY Left      Current Outpatient Medications   Medication Sig Dispense Refill    tamsulosin (FLOMAX) 0.4 MG capsule TAKE 1 CAPSULE BY MOUTH DAILY 30 MINUTES BEFORE A MEAL      hydrOXYzine (ATARAX) 25 MG tablet Take 1 tablet (25 mg) by mouth every 6 hours as needed for itching (Take with doses of hydrocodone) (Patient not taking: Reported on 2023) 10 tablet 0    pramipexole (MIRAPEX) 1 MG tablet Take 1 mg by mouth 3 times daily (Patient not taking: Reported on 2023)         Allergies   Allergen Reactions    Aloe     Bee Venom     Latex Itching    Morphine      Synthetic also    Other [Seasonal Allergies]      Fleece      Percocet [Oxycodone-Acetaminophen]      percocet     Risperidone      Trouble swallowing    Sodium Lauryl Sulfate     Wool Fiber     Bee Pollen Rash    Other [No Clinical Screening - See Comments] Rash     Contact dermatitis         Social History     Tobacco Use    Smoking status: Former     Packs/day: 1.00     Years: 8.00     Additional pack years: 0.00     Total pack years: 8.00     Types: Cigarettes     Start date: 1995     Quit date: 2003     Years since quittin.6    Smokeless tobacco: Never   Substance Use Topics    Alcohol use: No     Family History   Problem Relation Age of Onset    Arthritis Mother     Depression Mother     Eye Disorder Mother     Psychotic Disorder Mother     Obesity Mother     Respiratory Brother         asthma    Allergies Brother     Allergies Father     Asthma Father     Hypertension Father     Obesity Father     Hearing Loss Father     Colon Cancer Father     Arthritis Paternal Grandmother     Eye Disorder Paternal Grandmother     Diabetes Paternal Grandfather     Heart Disease Paternal Grandfather     Hypertension Paternal Grandfather     Allergies Brother      History   Drug Use No  "        Review of Systems    Review of Systems  Constitutional, HEENT, cardiovascular, pulmonary, GI, , musculoskeletal, neuro, skin, endocrine and psych systems are negative, except as otherwise noted.    Objective    /77 (BP Location: Left arm, Patient Position: Sitting, Cuff Size: Adult Regular)   Pulse 80   Temp 98.3  F (36.8  C) (Oral)   Resp 16   Ht 1.803 m (5' 11\")   Wt 75.9 kg (167 lb 6 oz)   SpO2 99%   BMI 23.34 kg/m     Estimated body mass index is 23.34 kg/m  as calculated from the following:    Height as of this encounter: 1.803 m (5' 11\").    Weight as of this encounter: 75.9 kg (167 lb 6 oz).  Physical Exam  GENERAL: alert and no distress  EYES: Eyes grossly normal to inspection, PERRL and conjunctivae and sclerae normal  HENT: ear canals and TM's normal, nose and mouth without ulcers or lesions  NECK: no adenopathy, no asymmetry, masses, or scars  RESP: lungs clear to auscultation - no rales, rhonchi or wheezes  CV: regular rate and rhythm, normal S1 S2, no S3 or S4, no murmur, click or rub, no peripheral edema  ABDOMEN: soft, nontender, no hepatosplenomegaly, no masses and bowel sounds normal  MS: no gross musculoskeletal defects noted, no edema  SKIN: no suspicious lesions or rashes  NEURO: Normal strength and tone, mentation intact and speech normal  PSYCH: mentation appears normal, affect normal/bright  LYMPH: no cervical, supraclavicular, axillary, or inguinal adenopathy    No results for input(s): \"HGB\", \"PLT\", \"INR\", \"NA\", \"POTASSIUM\", \"CR\", \"A1C\" in the last 89897 hours.     Diagnostics    Recent Results (from the past 240 hour(s))   Streptococcus A Rapid Screen w/Reflex to PCR - Clinic Collect    Collection Time: 01/31/24 12:15 PM    Specimen: Throat; Swab   Result Value Ref Range    Group A Strep antigen Negative Negative   Group A Streptococcus PCR Throat Swab    Collection Time: 01/31/24 12:15 PM    Specimen: Throat; Swab   Result Value Ref Range    Group A strep by PCR Not " Detected Not Detected   Basic metabolic panel  (Ca, Cl, CO2, Creat, Gluc, K, Na, BUN)    Collection Time: 01/31/24 12:21 PM   Result Value Ref Range    Sodium 139 135 - 145 mmol/L    Potassium 4.1 3.4 - 5.3 mmol/L    Chloride 104 98 - 107 mmol/L    Carbon Dioxide (CO2) 26 22 - 29 mmol/L    Anion Gap 9 7 - 15 mmol/L    Urea Nitrogen 12.6 6.0 - 20.0 mg/dL    Creatinine 1.10 0.67 - 1.17 mg/dL    GFR Estimate 86 >60 mL/min/1.73m2    Calcium 9.2 8.6 - 10.0 mg/dL    Glucose 61 (L) 70 - 99 mg/dL   CBC with platelets    Collection Time: 01/31/24 12:21 PM   Result Value Ref Range    WBC Count 6.1 4.0 - 11.0 10e3/uL    RBC Count 5.43 4.40 - 5.90 10e6/uL    Hemoglobin 16.1 13.3 - 17.7 g/dL    Hematocrit 48.0 40.0 - 53.0 %    MCV 88 78 - 100 fL    MCH 29.7 26.5 - 33.0 pg    MCHC 33.5 31.5 - 36.5 g/dL    RDW 12.0 10.0 - 15.0 %    Platelet Count 184 150 - 450 10e3/uL        No EKG required for low risk surgery (cataract, skin procedure, breast biopsy, etc).    Revised Cardiac Risk Index (RCRI)  The patient has the following serious cardiovascular risks for perioperative complications:   - No serious cardiac risks = 0 points     RCRI Interpretation: 0 points: Class I (very low risk - 0.4% complication rate)         Signed Electronically by: Mini Lawton DO  Copy of this evaluation report is provided to requesting physician.

## 2024-02-01 LAB
ANION GAP SERPL CALCULATED.3IONS-SCNC: 9 MMOL/L (ref 7–15)
BUN SERPL-MCNC: 12.6 MG/DL (ref 6–20)
CALCIUM SERPL-MCNC: 9.2 MG/DL (ref 8.6–10)
CHLORIDE SERPL-SCNC: 104 MMOL/L (ref 98–107)
CREAT SERPL-MCNC: 1.1 MG/DL (ref 0.67–1.17)
DEPRECATED HCO3 PLAS-SCNC: 26 MMOL/L (ref 22–29)
EGFRCR SERPLBLD CKD-EPI 2021: 86 ML/MIN/1.73M2
GLUCOSE SERPL-MCNC: 61 MG/DL (ref 70–99)
POTASSIUM SERPL-SCNC: 4.1 MMOL/L (ref 3.4–5.3)
SODIUM SERPL-SCNC: 139 MMOL/L (ref 135–145)

## 2024-02-01 NOTE — RESULT ENCOUNTER NOTE
Patient updated by [employer] message with lab results.        Hai Cuba,  Labs look good.  We will sign off on your preop.  I hope everything goes smoothly for you.  Reach out if you have any follow-up questions or concerns.  Mini Lawton, DO

## 2024-02-08 ENCOUNTER — ANESTHESIA EVENT (OUTPATIENT)
Dept: SURGERY | Facility: HOSPITAL | Age: 44
End: 2024-02-08
Payer: COMMERCIAL

## 2024-02-09 ENCOUNTER — ANESTHESIA (OUTPATIENT)
Dept: SURGERY | Facility: HOSPITAL | Age: 44
End: 2024-02-09
Payer: COMMERCIAL

## 2024-02-09 ENCOUNTER — HOSPITAL ENCOUNTER (OUTPATIENT)
Facility: HOSPITAL | Age: 44
Discharge: HOME | End: 2024-02-09
Attending: UROLOGY | Admitting: UROLOGY
Payer: COMMERCIAL

## 2024-02-09 VITALS
WEIGHT: 167 LBS | BODY MASS INDEX: 23.29 KG/M2 | DIASTOLIC BLOOD PRESSURE: 69 MMHG | RESPIRATION RATE: 20 BRPM | SYSTOLIC BLOOD PRESSURE: 117 MMHG | TEMPERATURE: 97.5 F | OXYGEN SATURATION: 100 % | HEART RATE: 51 BPM

## 2024-02-09 DIAGNOSIS — R39.12 WEAK URINARY STREAM: Primary | ICD-10-CM

## 2024-02-09 PROCEDURE — 710N000009 HC RECOVERY PHASE 1, LEVEL 1, PER MIN: Performed by: UROLOGY

## 2024-02-09 PROCEDURE — 360N000075 HC SURGERY LEVEL 2, PER MIN: Performed by: UROLOGY

## 2024-02-09 PROCEDURE — 710N000012 HC RECOVERY PHASE 2, PER MINUTE: Performed by: UROLOGY

## 2024-02-09 PROCEDURE — 999N000141 HC STATISTIC PRE-PROCEDURE NURSING ASSESSMENT: Performed by: UROLOGY

## 2024-02-09 PROCEDURE — 250N000011 HC RX IP 250 OP 636

## 2024-02-09 PROCEDURE — 258N000003 HC RX IP 258 OP 636

## 2024-02-09 PROCEDURE — 250N000011 HC RX IP 250 OP 636: Mod: JZ | Performed by: ANESTHESIOLOGY

## 2024-02-09 PROCEDURE — 250N000025 HC SEVOFLURANE, PER MIN: Performed by: UROLOGY

## 2024-02-09 PROCEDURE — 258N000003 HC RX IP 258 OP 636: Performed by: ANESTHESIOLOGY

## 2024-02-09 PROCEDURE — 250N000013 HC RX MED GY IP 250 OP 250 PS 637: Performed by: ANESTHESIOLOGY

## 2024-02-09 PROCEDURE — 250N000009 HC RX 250: Performed by: ANESTHESIOLOGY

## 2024-02-09 PROCEDURE — 272N000001 HC OR GENERAL SUPPLY STERILE: Performed by: UROLOGY

## 2024-02-09 PROCEDURE — 370N000017 HC ANESTHESIA TECHNICAL FEE, PER MIN: Performed by: UROLOGY

## 2024-02-09 PROCEDURE — 250N000011 HC RX IP 250 OP 636: Performed by: NURSE PRACTITIONER

## 2024-02-09 RX ORDER — ONDANSETRON 4 MG/1
4 TABLET, ORALLY DISINTEGRATING ORAL EVERY 30 MIN PRN
Status: DISCONTINUED | OUTPATIENT
Start: 2024-02-09 | End: 2024-02-09 | Stop reason: HOSPADM

## 2024-02-09 RX ORDER — HYDROMORPHONE HYDROCHLORIDE 1 MG/ML
0.4 INJECTION, SOLUTION INTRAMUSCULAR; INTRAVENOUS; SUBCUTANEOUS EVERY 5 MIN PRN
Status: DISCONTINUED | OUTPATIENT
Start: 2024-02-09 | End: 2024-02-09 | Stop reason: HOSPADM

## 2024-02-09 RX ORDER — LIDOCAINE 40 MG/G
CREAM TOPICAL
Status: DISCONTINUED | OUTPATIENT
Start: 2024-02-09 | End: 2024-02-09 | Stop reason: HOSPADM

## 2024-02-09 RX ORDER — SODIUM CHLORIDE, SODIUM LACTATE, POTASSIUM CHLORIDE, CALCIUM CHLORIDE 600; 310; 30; 20 MG/100ML; MG/100ML; MG/100ML; MG/100ML
INJECTION, SOLUTION INTRAVENOUS CONTINUOUS
Status: DISCONTINUED | OUTPATIENT
Start: 2024-02-09 | End: 2024-02-09 | Stop reason: HOSPADM

## 2024-02-09 RX ORDER — CEFAZOLIN SODIUM/WATER 2 G/20 ML
2 SYRINGE (ML) INTRAVENOUS
Status: COMPLETED | OUTPATIENT
Start: 2024-02-09 | End: 2024-02-09

## 2024-02-09 RX ORDER — FENTANYL CITRATE 50 UG/ML
INJECTION, SOLUTION INTRAMUSCULAR; INTRAVENOUS PRN
Status: DISCONTINUED | OUTPATIENT
Start: 2024-02-09 | End: 2024-02-09

## 2024-02-09 RX ORDER — ONDANSETRON 2 MG/ML
INJECTION INTRAMUSCULAR; INTRAVENOUS PRN
Status: DISCONTINUED | OUTPATIENT
Start: 2024-02-09 | End: 2024-02-09

## 2024-02-09 RX ORDER — OXYCODONE HYDROCHLORIDE 5 MG/1
5 TABLET ORAL
Status: DISCONTINUED | OUTPATIENT
Start: 2024-02-09 | End: 2024-02-09 | Stop reason: HOSPADM

## 2024-02-09 RX ORDER — GLYCOPYRROLATE 0.2 MG/ML
INJECTION, SOLUTION INTRAMUSCULAR; INTRAVENOUS PRN
Status: DISCONTINUED | OUTPATIENT
Start: 2024-02-09 | End: 2024-02-09

## 2024-02-09 RX ORDER — ONDANSETRON 2 MG/ML
4 INJECTION INTRAMUSCULAR; INTRAVENOUS EVERY 30 MIN PRN
Status: DISCONTINUED | OUTPATIENT
Start: 2024-02-09 | End: 2024-02-09 | Stop reason: HOSPADM

## 2024-02-09 RX ORDER — OXYCODONE HYDROCHLORIDE 5 MG/1
10 TABLET ORAL
Status: DISCONTINUED | OUTPATIENT
Start: 2024-02-09 | End: 2024-02-09 | Stop reason: HOSPADM

## 2024-02-09 RX ORDER — NALOXONE HYDROCHLORIDE 0.4 MG/ML
0.2 INJECTION, SOLUTION INTRAMUSCULAR; INTRAVENOUS; SUBCUTANEOUS
Status: DISCONTINUED | OUTPATIENT
Start: 2024-02-09 | End: 2024-02-09 | Stop reason: HOSPADM

## 2024-02-09 RX ORDER — DEXAMETHASONE SODIUM PHOSPHATE 10 MG/ML
INJECTION, SOLUTION INTRAMUSCULAR; INTRAVENOUS PRN
Status: DISCONTINUED | OUTPATIENT
Start: 2024-02-09 | End: 2024-02-09

## 2024-02-09 RX ORDER — PROPOFOL 10 MG/ML
INJECTION, EMULSION INTRAVENOUS CONTINUOUS PRN
Status: DISCONTINUED | OUTPATIENT
Start: 2024-02-09 | End: 2024-02-09

## 2024-02-09 RX ORDER — ACETAMINOPHEN 325 MG/1
975 TABLET ORAL ONCE
Qty: 3 TABLET | Refills: 0 | Status: DISCONTINUED | OUTPATIENT
Start: 2024-02-09 | End: 2024-02-09 | Stop reason: HOSPADM

## 2024-02-09 RX ORDER — ACETAMINOPHEN 325 MG/1
650 TABLET ORAL EVERY 4 HOURS PRN
Status: DISCONTINUED | OUTPATIENT
Start: 2024-02-09 | End: 2024-02-09 | Stop reason: HOSPADM

## 2024-02-09 RX ORDER — FENTANYL CITRATE 50 UG/ML
50 INJECTION, SOLUTION INTRAMUSCULAR; INTRAVENOUS EVERY 5 MIN PRN
Status: DISCONTINUED | OUTPATIENT
Start: 2024-02-09 | End: 2024-02-09 | Stop reason: HOSPADM

## 2024-02-09 RX ORDER — PROPOFOL 10 MG/ML
INJECTION, EMULSION INTRAVENOUS PRN
Status: DISCONTINUED | OUTPATIENT
Start: 2024-02-09 | End: 2024-02-09

## 2024-02-09 RX ORDER — HYDROMORPHONE HYDROCHLORIDE 1 MG/ML
0.2 INJECTION, SOLUTION INTRAMUSCULAR; INTRAVENOUS; SUBCUTANEOUS EVERY 5 MIN PRN
Status: DISCONTINUED | OUTPATIENT
Start: 2024-02-09 | End: 2024-02-09 | Stop reason: HOSPADM

## 2024-02-09 RX ORDER — NALOXONE HYDROCHLORIDE 0.4 MG/ML
INJECTION, SOLUTION INTRAMUSCULAR; INTRAVENOUS; SUBCUTANEOUS PRN
Status: DISCONTINUED | OUTPATIENT
Start: 2024-02-09 | End: 2024-02-09

## 2024-02-09 RX ORDER — NALOXONE HYDROCHLORIDE 0.4 MG/ML
0.4 INJECTION, SOLUTION INTRAMUSCULAR; INTRAVENOUS; SUBCUTANEOUS
Status: DISCONTINUED | OUTPATIENT
Start: 2024-02-09 | End: 2024-02-09 | Stop reason: HOSPADM

## 2024-02-09 RX ORDER — ACETAMINOPHEN 325 MG/1
975 TABLET ORAL ONCE
Status: COMPLETED | OUTPATIENT
Start: 2024-02-09 | End: 2024-02-09

## 2024-02-09 RX ORDER — CEFAZOLIN SODIUM/WATER 2 G/20 ML
2 SYRINGE (ML) INTRAVENOUS SEE ADMIN INSTRUCTIONS
Status: DISCONTINUED | OUTPATIENT
Start: 2024-02-09 | End: 2024-02-09 | Stop reason: HOSPADM

## 2024-02-09 RX ORDER — FENTANYL CITRATE 50 UG/ML
25 INJECTION, SOLUTION INTRAMUSCULAR; INTRAVENOUS EVERY 5 MIN PRN
Status: DISCONTINUED | OUTPATIENT
Start: 2024-02-09 | End: 2024-02-09 | Stop reason: HOSPADM

## 2024-02-09 RX ORDER — HYDROCODONE BITARTRATE AND ACETAMINOPHEN 5; 325 MG/1; MG/1
1 TABLET ORAL EVERY 4 HOURS PRN
Status: DISCONTINUED | OUTPATIENT
Start: 2024-02-09 | End: 2024-02-09 | Stop reason: HOSPADM

## 2024-02-09 RX ORDER — TAMSULOSIN HYDROCHLORIDE 0.4 MG/1
0.4 CAPSULE ORAL AT BEDTIME
Qty: 90 CAPSULE | Refills: 0 | Status: SHIPPED | OUTPATIENT
Start: 2024-02-09

## 2024-02-09 RX ORDER — FENTANYL CITRATE 50 UG/ML
25 INJECTION, SOLUTION INTRAMUSCULAR; INTRAVENOUS
Status: DISCONTINUED | OUTPATIENT
Start: 2024-02-09 | End: 2024-02-09 | Stop reason: HOSPADM

## 2024-02-09 RX ADMIN — Medication 2 G: at 10:31

## 2024-02-09 RX ADMIN — PHENYLEPHRINE HYDROCHLORIDE 200 MCG: 10 INJECTION INTRAVENOUS at 10:53

## 2024-02-09 RX ADMIN — PROPOFOL 150 MCG/KG/MIN: 10 INJECTION, EMULSION INTRAVENOUS at 10:38

## 2024-02-09 RX ADMIN — GLYCOPYRROLATE 0.2 MG: 0.2 INJECTION INTRAMUSCULAR; INTRAVENOUS at 10:53

## 2024-02-09 RX ADMIN — MIDAZOLAM 2 MG: 1 INJECTION INTRAMUSCULAR; INTRAVENOUS at 10:31

## 2024-02-09 RX ADMIN — GLYCOPYRROLATE 0.2 MG: 0.2 INJECTION INTRAMUSCULAR; INTRAVENOUS at 11:15

## 2024-02-09 RX ADMIN — SODIUM CHLORIDE, POTASSIUM CHLORIDE, SODIUM LACTATE AND CALCIUM CHLORIDE: 600; 310; 30; 20 INJECTION, SOLUTION INTRAVENOUS at 10:38

## 2024-02-09 RX ADMIN — NALOXONE HYDROCHLORIDE 0.1 MG: 0.4 INJECTION, SOLUTION INTRAMUSCULAR; INTRAVENOUS; SUBCUTANEOUS at 11:22

## 2024-02-09 RX ADMIN — DEXAMETHASONE SODIUM PHOSPHATE 10 MG: 10 INJECTION, SOLUTION INTRAMUSCULAR; INTRAVENOUS at 10:38

## 2024-02-09 RX ADMIN — FENTANYL CITRATE 50 MCG: 50 INJECTION INTRAMUSCULAR; INTRAVENOUS at 10:53

## 2024-02-09 RX ADMIN — NALOXONE HYDROCHLORIDE 0.1 MG: 0.4 INJECTION, SOLUTION INTRAMUSCULAR; INTRAVENOUS; SUBCUTANEOUS at 11:18

## 2024-02-09 RX ADMIN — PROPOFOL 150 MG: 10 INJECTION, EMULSION INTRAVENOUS at 10:38

## 2024-02-09 RX ADMIN — ONDANSETRON 4 MG: 2 INJECTION INTRAMUSCULAR; INTRAVENOUS at 10:38

## 2024-02-09 RX ADMIN — FENTANYL CITRATE 50 MCG: 50 INJECTION INTRAMUSCULAR; INTRAVENOUS at 10:38

## 2024-02-09 RX ADMIN — ACETAMINOPHEN 975 MG: 325 TABLET ORAL at 10:14

## 2024-02-09 RX ADMIN — LIDOCAINE HYDROCHLORIDE 50 MG: 10 INJECTION, SOLUTION INFILTRATION; PERINEURAL at 10:38

## 2024-02-09 ASSESSMENT — ACTIVITIES OF DAILY LIVING (ADL)
ADLS_ACUITY_SCORE: 20
ADLS_ACUITY_SCORE: 21

## 2024-02-09 NOTE — ANESTHESIA CARE TRANSFER NOTE
Patient: Bereket Perry    Procedure: Procedure(s):  CYSTOSCOPY       Diagnosis: Slowing of urinary stream [R39.198]  Diagnosis Additional Information: No value filed.    Anesthesia Type:   General     Note:    Oropharynx: oropharynx clear of all foreign objects  Level of Consciousness: awake  Oxygen Supplementation: face mask  Level of Supplemental Oxygen (L/min / FiO2): 6    Dentition: dentition unchanged  Vital Signs Stable: post-procedure vital signs reviewed and stable  Report to RN Given: handoff report given  Patient transferred to: PACU    Handoff Report: Identifed the Patient, Identified the Reponsible Provider, Reviewed the pertinent medical history, Discussed the surgical course, Reviewed Intra-OP anesthesia mangement and issues during anesthesia, Set expectations for post-procedure period and Allowed opportunity for questions and acknowledgement of understanding      Vitals:  Vitals Value Taken Time   /61 02/09/24 1130   Temp 98    Pulse 68 02/09/24 1130   Resp 17 02/09/24 1130   SpO2 100 % 02/09/24 1130   Vitals shown include unfiled device data.    Electronically Signed By: NETTE Ortega CRNA  February 9, 2024  11:31 AM

## 2024-02-09 NOTE — ANESTHESIA PREPROCEDURE EVALUATION
Anesthesia Pre-Procedure Evaluation    Patient: Bereket Perry   MRN: [id number] : 1980        Procedure : Procedure(s):  CYSTOSCOPY          Past Medical History:   Diagnosis Date    Allergic rhinitis, cause unspecified     Allergic rhinitis    Arthritis     injuries    Bipolar disorder, unspecified (H)     Manic-depressive    Esophageal reflux     Gastroesophageal reflux    Hiatal hernia     s/p douglas    Slow urinary stream     Tourette's disorder       Past Surgical History:   Procedure Laterality Date    CLAVICLE SURGERY      ESOPHAGEAL MOTILITY STUDY  2013    ESOPHAGOSCOPY, GASTROSCOPY, DUODENOSCOPY (EGD), COMBINED  2013    Procedure: COMBINED ESOPHAGOSCOPY, GASTROSCOPY, DUODENOSCOPY (EGD), BIOPSY SINGLE OR MULTIPLE;;  Surgeon: Alex Erwin MD;  Location:  GI    ESOPHAGOSCOPY, GASTROSCOPY, DUODENOSCOPY (EGD), COMBINED  01/10/2014    Procedure: COMBINED ESOPHAGOSCOPY, GASTROSCOPY, DUODENOSCOPY (EGD);;  Surgeon: Daniel Gutiérrez MD;  Location:  OR     ESOPH/GAS REFLUX TEST W NASAL IMPED ELECTRODE  2013    Procedure: ESOPHAGEAL IMPEDENCE FUNCTION TEST 1 HOUR OR LESS;  Surgeon: Alex Erwin MD;  Location:  GI    LAPAROSCOPIC NISSEN FUNDOPLICATION  01/10/2014    Procedure: LAPAROSCOPIC NISSEN FUNDOPLICATION;  Laparoscopic Nissen Fundoplication,hiatal hernia repair,and Upper Endoscopy *Latex Allergy* ;  Surgeon: Daniel Gutiérrez MD;  Location: [facility]    ORTHOPEDIC SURGERY  ; ; ; ; ;     OTHER SURGICAL HISTORY  2015    wrist surgery    SURGICAL HISTORY OF -   1995    LT wrist surgery    SURGICAL HISTORY OF -   1999    RT wrist surgery    WRIST SURGERY Right     x3    WRIST SURGERY Left       Allergies   Allergen Reactions    Aloe     Bee Venom     Latex Itching    Morphine      Synthetic also    Other [Seasonal Allergies]      Fleece      Percocet [Oxycodone-Acetaminophen]      percocet      Risperidone      Trouble swallowing    Sodium Lauryl Sulfate     Wool Fiber     Bee Pollen Rash    Other [No Clinical Screening - See Comments] Rash     Contact dermatitis       Social History     Tobacco Use    Smoking status: Former     Packs/day: 1.00     Years: 8.00     Additional pack years: 0.00     Total pack years: 8.00     Types: Cigarettes     Start date: 1995     Quit date: 2003     Years since quittin.6    Smokeless tobacco: Never   Substance Use Topics    Alcohol use: No      Wt Readings from Last 1 Encounters:   24 75.8 kg (167 lb)        Anesthesia Evaluation            ROS/MED HX  ENT/Pulmonary:  - neg pulmonary ROS     Neurologic: Comment: Tourette's Disorder      Cardiovascular:  - neg cardiovascular ROS     METS/Exercise Tolerance:     Hematologic:  - neg hematologic  ROS     Musculoskeletal:  - neg musculoskeletal ROS (+)  arthritis,             GI/Hepatic:     (+) GERD,                (-) hiatal hernia (Surgical repaired, no longer has symptoms)   Renal/Genitourinary:  - neg Renal ROS     Endo:  - neg endo ROS     Psychiatric/Substance Use:     (+) psychiatric history bipolar       Infectious Disease:       Malignancy:       Other:            Physical Exam    Airway  airway exam normal      Mallampati: I   TM distance: > 3 FB   Neck ROM: full   Mouth opening: > 3 cm    Respiratory Devices and Support         Dental       (+) Minor Abnormalities - some fillings, tiny chips      Cardiovascular   cardiovascular exam normal          Pulmonary                   OUTSIDE LABS:  CBC:   Lab Results   Component Value Date    WBC 6.1 2024    WBC 6.3 2017    HGB 16.1 2024    HGB 15.0 2017    HCT 48.0 2024    HCT 44.4 2017     2024     2017     BMP:   Lab Results   Component Value Date     2024     2017    POTASSIUM 4.1 2024    POTASSIUM 3.9 2017    CHLORIDE 104 2024    CHLORIDE 106  "07/20/2017    CO2 26 01/31/2024    CO2 28 07/20/2017    BUN 12.6 01/31/2024    BUN 20 07/20/2017    CR 1.10 01/31/2024    CR 0.96 07/20/2017    GLC 61 (L) 01/31/2024    GLC 94 07/13/2018     COAGS:   Lab Results   Component Value Date    PTT 31 02/09/2014    INR 1.02 02/09/2014     POC: No results found for: \"BGM\", \"HCG\", \"HCGS\"  HEPATIC:   Lab Results   Component Value Date    ALBUMIN 4.1 07/20/2017    PROTTOTAL 7.4 07/20/2017    ALT 34 07/20/2017    AST 23 07/20/2017    ALKPHOS 95 07/20/2017    BILITOTAL 1.1 07/20/2017     OTHER:   Lab Results   Component Value Date    PETE 9.2 01/31/2024    PHOS 3.8 01/13/2014    MAG 2.4 (H) 07/20/2017    LIPASE 56 05/22/2014    TSH 1.03 03/01/2013       Anesthesia Plan    ASA Status:  2    NPO Status:  NPO Appropriate    Anesthesia Type: General.     - Airway: LMA   Induction: Intravenous.   Maintenance: Balanced.        Consents    Anesthesia Plan(s) and associated risks, benefits, and realistic alternatives discussed. Questions answered and patient/representative(s) expressed understanding.     - Discussed:     - Discussed with:  Patient      - Extended Intubation/Ventilatory Support Discussed: No.      - Patient is DNR/DNI Status: No          Postoperative Care    Pain management: Multi-modal analgesia.   PONV prophylaxis: Ondansetron (or other 5HT-3), Dexamethasone or Solumedrol     Comments:    Other Comments:   GA - LMA  Fentanyl prn  sharonda Wagner MD    I have reviewed the pertinent notes and labs in the chart from the past 30 days and (re)examined the patient.  Any updates or changes from those notes are reflected in this note.                  "

## 2024-02-09 NOTE — ANESTHESIA PROCEDURE NOTES
Airway       Patient location during procedure: OR       Procedure Start/Stop Times: 2/9/2024 10:40 AM  Staff -        CRNA: Sun Lozada APRN CRNA       Performed By: CRNA  Consent for Airway        Urgency: elective  Indications and Patient Condition       Indications for airway management: aidee-procedural       Induction type:intravenous       Mask difficulty assessment: 0 - not attempted    Final Airway Details       Final airway type: supraglottic airway    Supraglottic Airway Details        Type: LMA       Brand: Ambu AuraGain       LMA size: 5       Cuff Pressure (cm H2O): 0    Post intubation assessment        Placement verified by: capnometry and chest rise        Number of attempts at approach: 1       Number of other approaches attempted: 0       Secured with: tape       Ease of procedure: easy       Dentition: Intact and Unchanged    Medication(s) Administered   Medication Administration Time: 2/9/2024 10:40 AM

## 2024-02-09 NOTE — OP NOTE
"Location: [facility]      Patient Name: Bereket Perry  Patient : 1980  Patient MRN: [id number]  Patient CSN: [id number]  Patient PCP: No Ref-Primary, Physician    Date of Service: 24     Pre-operative diagnosis: Pain with voiding, weak stream    Post-operative diagnosis: Pain with voiding, weak stream    Procedure:  Cystoscopy    Surgeon: Dr. Jimmy Thomas    EBL: 0 mL    Anesthesia: General    Indication: 43 year old male urinary frequency that started 5 days after falling/jumping out of  car on 2020 in [place]. He had a cystoscopy with Dr. Jin on 21 which did not show any concerns. Patient requested that Flomax be started at that time for weak stream and pain with voiding.  He didn't follow up for almost 2.5 years.  I saw him on 23 and he had pain with voiding (\"numbing\", sharp, dull), difficulty starting his stream, and a weak stream. I recommended cystoscopy at that time and he requested it be done under anesthesia (was initially scheduled for 23 but patient cancelled).  In pre-op, he tells me he has pain with voiding if missing a single dose of Flomax.  Risks, benefits, and alternatives to treatment were discussed with the patient and the patient elected to proceed.    Findings: His urethra was normal. His prostate was short and small and the lateral lobe barely touched. No bladder neck contracture. Bladder was normal without trabeculation, stones, or tumors. Bilateral ureteral orifices were normal.    Specimens: None    Procedure:  After written informed consent was obtained the patient was brought into the operating room.  The patient was properly identified prior to the procedure, received preprocedure antibiotics, and had sequential compression devices on the legs.  The patient was then induced under anesthesia.    The patient was placed in dorsal lithotomy position and prepped and draped in the usual sterile fashion.    Rigid cystoscopy using the 17 Fr sheath " was performed with the above findings. His bladde was emptied and the cystoscope removed.    At this point, the procedure was completed.  Patient tolerated the procedure well.    Plan: Continue Flomax. Follow up on 3/15/24 to discuss further management options.    Jimmy Thomas MD   11:02 AM

## 2024-02-09 NOTE — ANESTHESIA POSTPROCEDURE EVALUATION
Patient: Bereket Perry    Procedure: Procedure(s):  CYSTOSCOPY       Anesthesia Type:  General    Note:  Disposition: Outpatient   Postop Pain Control: Uneventful            Sign Out: Well controlled pain   PONV: No   Neuro/Psych: Uneventful            Sign Out: Acceptable/Baseline neuro status   Airway/Respiratory: Uneventful            Sign Out: Acceptable/Baseline resp. status   CV/Hemodynamics: Uneventful            Sign Out: Acceptable CV status; No obvious hypovolemia; No obvious fluid overload   Other NRE: NONE   DID A NON-ROUTINE EVENT OCCUR?            Last vitals:  Vitals Value Taken Time   /62 02/09/24 1230   Temp 36.2  C (97.2  F) 02/09/24 1128   Pulse 47 02/09/24 1234   Resp 26 02/09/24 1234   SpO2 99 % 02/09/24 1234   Vitals shown include unfiled device data.    Electronically Signed By: Elyssa Lieberman MD  February 9, 2024  12:46 PM

## 2024-03-22 ENCOUNTER — TRANSCRIBE ORDERS (OUTPATIENT)
Dept: OTHER | Age: 44
End: 2024-03-22

## 2024-03-22 DIAGNOSIS — R39.198 OTHER DIFFICULTIES WITH MICTURITION: Primary | ICD-10-CM

## 2024-04-14 ENCOUNTER — HEALTH MAINTENANCE LETTER (OUTPATIENT)
Age: 44
End: 2024-04-14

## 2024-06-24 ENCOUNTER — THERAPY VISIT (OUTPATIENT)
Dept: PHYSICAL THERAPY | Facility: CLINIC | Age: 44
End: 2024-06-24
Payer: COMMERCIAL

## 2024-06-24 DIAGNOSIS — R39.198 OTHER DIFFICULTIES WITH MICTURITION: Primary | ICD-10-CM

## 2024-06-24 PROCEDURE — 97110 THERAPEUTIC EXERCISES: CPT | Mod: GP

## 2024-06-24 PROCEDURE — 97162 PT EVAL MOD COMPLEX 30 MIN: CPT | Mod: GP

## 2024-06-24 PROCEDURE — 97530 THERAPEUTIC ACTIVITIES: CPT | Mod: GP

## 2024-06-24 NOTE — PROGRESS NOTES
PHYSICAL THERAPY EVALUATION  Type of Visit: Evaluation              Subjective       Presenting condition or subjective complaint:  Having trouble emptying the bladder. Pain with bladder filling and emptying. The pain is focused in lower abdomen and will progress up the abdomen with large full.   Date of onset: 06/24/24    Relevant medical history:     Dates & types of surgery:  fundiplication 2014 7 (full).     Prior diagnostic imaging/testing results:       Prior therapy history for the same diagnosis, illness or injury:        Pain assessment: See objective evaluation for additional pain details     Objective      PELVIC EVALUATION  ADDITIONAL HISTORY:  Sex assigned at birth:    Gender identity:      Pronouns:        Bladder History: Sitting to empty bladder.   Feels bladder filling:  yes - pain.   Triggers for feeling of inability to wait to go to the bathroom:      How long can you wait to urinate:    Gets up at night to urinate:    1x/night.   Can stop the flow of urine when urinating:  sometimes.   Volume of urine usually released:   normal.  Number of bladder infections in last 12 months:   0   Fluid intake per day:      50 - 60oz  Medications taken for bladder:     Flomax - able to have less pain when taking. Pain onset without it.   Activities causing urine leak:    none  Amount of urine typically leaked:    Pads used to help with leaking:        Occ painful to start the urinary stream and throughout emptying.     Bowel History: Ciliac disease. Gluten will lead to diarrhea. Able to eat to have consistency.        Discussed reason for referral regarding pelvic health needs and external/internal pelvic floor muscle examination with patient/guardian.  Opportunity provided to ask questions and verbal consent for assessment and intervention was given.    PAIN: Pain is Exacerbated By: starting urinary stream, emptying bladder.   POSTURE: WNL  LUMBAR SCREEN:  Slightly restricted motion due to mm tension.   HIP  SCREEN:  Strength: WNL     PELVIC/SI SCREEN:  Positive for R upslip.   BREATHING SYMMETRY: Decreased rib cage mobility    PELVIC EXAM - deferred internal exam for today.   External Digital Palpation per Perineum:   Levator ani: Tightness, Tenderness  Perineal body: not assessed today.   Coccyx: Tightness, Tenderness    Assessment & Plan   CLINICAL IMPRESSIONS  Medical Diagnosis: 8    Treatment Diagnosis: Other difficulties of micturition   Impression/Assessment: Patient is a 44 year old male with painful and difficulty emptying his bladder complaints.  The following significant findings have been identified: Pain, Decreased proprioception, Impaired muscle performance, and Decreased activity tolerance. These impairments interfere with their ability to perform self care tasks as compared to previous level of function.     Clinical Decision Making (Complexity):  Clinical Presentation: Unstable/Unpredictable   Clinical Presentation Rationale: based on medical and personal factors listed in PT evaluation  Clinical Decision Making (Complexity): Moderate complexity    PLAN OF CARE  Treatment Interventions:  Modalities: Biofeedback, E-stim  Interventions: Gait Training, Manual Therapy, Neuromuscular Re-education, Therapeutic Activity, Therapeutic Exercise, Self-Care/Home Management    Long Term Goals     PT Goal 1  Goal Identifier: LTG1  Goal Description: Pt will report no pain with bladder filling or emptying in order to improve micturition.  Target Date: 08/05/24      Frequency of Treatment: 1x/week  Duration of Treatment: 6 weeks    Education Assessment:   Learner/Method: Patient;No Barriers to Learning    Risks and benefits of evaluation/treatment have been explained.   Patient/Family/caregiver agrees with Plan of Care.     Evaluation Time:     PT Eval, Moderate Complexity Minutes (65939): 35     Signing Clinician: Renata Abdalla, PT

## 2024-07-01 ENCOUNTER — THERAPY VISIT (OUTPATIENT)
Dept: PHYSICAL THERAPY | Facility: CLINIC | Age: 44
End: 2024-07-01
Payer: COMMERCIAL

## 2024-07-01 DIAGNOSIS — R39.198 OTHER DIFFICULTIES WITH MICTURITION: Primary | ICD-10-CM

## 2024-07-01 PROCEDURE — 97530 THERAPEUTIC ACTIVITIES: CPT | Mod: GP

## 2024-07-01 PROCEDURE — 97110 THERAPEUTIC EXERCISES: CPT | Mod: 59

## 2024-07-08 ENCOUNTER — THERAPY VISIT (OUTPATIENT)
Dept: PHYSICAL THERAPY | Facility: CLINIC | Age: 44
End: 2024-07-08
Payer: COMMERCIAL

## 2024-07-08 DIAGNOSIS — R39.198 OTHER DIFFICULTIES WITH MICTURITION: Primary | ICD-10-CM

## 2024-07-08 PROCEDURE — 97112 NEUROMUSCULAR REEDUCATION: CPT | Mod: GP

## 2024-08-26 ENCOUNTER — THERAPY VISIT (OUTPATIENT)
Dept: PHYSICAL THERAPY | Facility: CLINIC | Age: 44
End: 2024-08-26
Payer: COMMERCIAL

## 2024-08-26 DIAGNOSIS — R39.198 OTHER DIFFICULTIES WITH MICTURITION: Primary | ICD-10-CM

## 2024-08-26 PROCEDURE — 97530 THERAPEUTIC ACTIVITIES: CPT | Mod: GP

## 2024-10-07 NOTE — PROGRESS NOTES
DISCHARGE SUMMARY    Bereketcecil Perry was seen 4  times for evaluation and treatment. Patient was consistently 15+ minutes late to appointments which limited treatment progress. Patient did not return for further treatment and current status is unknown.  Due to short treatment duration, no objective or functional changes were made.  Please see goal flow sheet from episode noted date below and initial evaluation for further information.  Patient is discharged from therapy and therapy episode is resolved as of 10/07/24.      Linked Episodes   Type: Episode: Status: Noted: Resolved: Last update: Updated by:   PHYSICAL THERAPY Pelvic Floor Active 6/24/2024 8/26/2024  4:06 PM Renata Abdalla, PT      Comments:

## 2025-04-19 ENCOUNTER — HEALTH MAINTENANCE LETTER (OUTPATIENT)
Age: 45
End: 2025-04-19

## (undated) DEVICE — PREP DYNA-HEX 4% CHG SCRUB 4OZ BOTTLE MDS098710

## (undated) DEVICE — SOL WATER IRRIG 1000ML BOTTLE 2F7114

## (undated) DEVICE — MAT FLOOR SURGICAL 40X38 0702140238

## (undated) DEVICE — GLOVE BIOGEL PI INDICATOR 8.0 LF 41680

## (undated) DEVICE — GLOVE BIOGEL PI ULTRATOUCH G SZ 7.5 42175

## (undated) DEVICE — TUBING SUCTION MEDI-VAC 1/4"X20' N620A

## (undated) DEVICE — GOWN XLG DISP 9545

## (undated) DEVICE — SUCTION MANIFOLD NEPTUNE 2 SYS 1 PORT 702-025-000

## (undated) DEVICE — CUSTOM PACK CYSTO PREFERRED SOT5BCYHEA

## (undated) DEVICE — SOL WATER IRRIG 3000ML BAG 2B7117

## (undated) DEVICE — KIT ENDO FIRST STEP DISINFECTANT 200ML W/POUCH EP-4

## (undated) RX ORDER — GLYCOPYRROLATE 0.2 MG/ML
INJECTION, SOLUTION INTRAMUSCULAR; INTRAVENOUS
Status: DISPENSED
Start: 2024-02-09

## (undated) RX ORDER — NALOXONE HYDROCHLORIDE 0.4 MG/ML
INJECTION, SOLUTION INTRAMUSCULAR; INTRAVENOUS; SUBCUTANEOUS
Status: DISPENSED
Start: 2024-02-09

## (undated) RX ORDER — FENTANYL CITRATE 50 UG/ML
INJECTION, SOLUTION INTRAMUSCULAR; INTRAVENOUS
Status: DISPENSED
Start: 2024-02-09